# Patient Record
Sex: MALE | Race: WHITE | NOT HISPANIC OR LATINO | Employment: FULL TIME | URBAN - NONMETROPOLITAN AREA
[De-identification: names, ages, dates, MRNs, and addresses within clinical notes are randomized per-mention and may not be internally consistent; named-entity substitution may affect disease eponyms.]

---

## 2021-01-07 ENCOUNTER — IMMUNIZATION (OUTPATIENT)
Dept: OBSTETRICS AND GYNECOLOGY | Facility: CLINIC | Age: 86
End: 2021-01-07
Payer: MEDICARE

## 2021-01-07 DIAGNOSIS — Z23 NEED FOR VACCINATION: ICD-10-CM

## 2021-01-07 PROCEDURE — 91300 COVID-19, MRNA, LNP-S, PF, 30 MCG/0.3 ML DOSE VACCINE: CPT | Mod: PBBFAC

## 2021-01-28 ENCOUNTER — IMMUNIZATION (OUTPATIENT)
Dept: OBSTETRICS AND GYNECOLOGY | Facility: CLINIC | Age: 86
End: 2021-01-28
Payer: MEDICARE

## 2021-01-28 DIAGNOSIS — Z23 NEED FOR VACCINATION: Primary | ICD-10-CM

## 2021-01-28 PROCEDURE — 91300 COVID-19, MRNA, LNP-S, PF, 30 MCG/0.3 ML DOSE VACCINE: CPT | Mod: PBBFAC

## 2021-01-28 PROCEDURE — 0002A COVID-19, MRNA, LNP-S, PF, 30 MCG/0.3 ML DOSE VACCINE: CPT | Mod: PBBFAC

## 2021-04-01 ENCOUNTER — HOSPITAL ENCOUNTER (OUTPATIENT)
Dept: RADIOLOGY | Facility: HOSPITAL | Age: 86
Discharge: HOME | End: 2021-04-01
Attending: INTERNAL MEDICINE
Payer: MEDICARE

## 2021-04-01 DIAGNOSIS — R05.9 COUGH: ICD-10-CM

## 2021-04-01 DIAGNOSIS — R05.9 COUGH: Primary | ICD-10-CM

## 2021-04-01 PROCEDURE — 71046 X-RAY EXAM CHEST 2 VIEWS: CPT | Mod: TC

## 2021-10-15 ENCOUNTER — IMMUNIZATION (OUTPATIENT)
Dept: OBSTETRICS AND GYNECOLOGY | Facility: CLINIC | Age: 86
End: 2021-10-15
Payer: MEDICARE

## 2021-10-15 DIAGNOSIS — Z23 NEED FOR VACCINATION: Primary | ICD-10-CM

## 2021-10-15 PROCEDURE — 0003A COVID-19, MRNA, LNP-S, PF, 30 MCG/0.3 ML DOSE VACCINE: CPT | Mod: PBBFAC | Performed by: ANESTHESIOLOGY

## 2021-10-15 PROCEDURE — 91300 COVID-19, MRNA, LNP-S, PF, 30 MCG/0.3 ML DOSE VACCINE: CPT | Mod: PBBFAC

## 2021-11-17 ENCOUNTER — HOSPITAL ENCOUNTER (OUTPATIENT)
Dept: RADIOLOGY | Facility: HOSPITAL | Age: 86
Discharge: HOME | End: 2021-11-17
Attending: NURSE PRACTITIONER
Payer: MEDICARE

## 2021-11-17 DIAGNOSIS — R91.8 ABNORMAL FINDINGS ON DIAGNOSTIC IMAGING OF LUNG: Primary | ICD-10-CM

## 2021-11-17 DIAGNOSIS — R91.8 ABNORMAL FINDINGS ON DIAGNOSTIC IMAGING OF LUNG: ICD-10-CM

## 2021-11-17 PROCEDURE — 71250 CT THORAX DX C-: CPT | Mod: TC

## 2021-11-30 ENCOUNTER — HOSPITAL ENCOUNTER (OUTPATIENT)
Dept: RADIOLOGY | Facility: HOSPITAL | Age: 86
Discharge: HOME | End: 2021-11-30
Attending: INTERNAL MEDICINE
Payer: MEDICARE

## 2021-11-30 DIAGNOSIS — I26.99 PULMONARY EMBOLISM: Primary | ICD-10-CM

## 2021-11-30 DIAGNOSIS — I26.99 PULMONARY EMBOLISM: ICD-10-CM

## 2021-11-30 PROCEDURE — 25500020 PHARM REV CODE 255: Performed by: INTERNAL MEDICINE

## 2021-11-30 PROCEDURE — 71275 CT ANGIOGRAPHY CHEST: CPT | Mod: TC

## 2021-11-30 RX ADMIN — IOHEXOL 100 ML: 350 INJECTION, SOLUTION INTRAVENOUS at 10:11

## 2021-12-07 ENCOUNTER — LAB VISIT (OUTPATIENT)
Dept: LAB | Facility: HOSPITAL | Age: 86
End: 2021-12-07
Attending: INTERNAL MEDICINE
Payer: MEDICARE

## 2021-12-07 DIAGNOSIS — K92.1 BLOOD IN STOOL: ICD-10-CM

## 2021-12-07 DIAGNOSIS — D64.9 ANEMIA, UNSPECIFIED: Primary | ICD-10-CM

## 2021-12-07 LAB — OB PNL STL: NEGATIVE

## 2021-12-07 PROCEDURE — 82272 OCCULT BLD FECES 1-3 TESTS: CPT | Performed by: INTERNAL MEDICINE

## 2021-12-15 DIAGNOSIS — R91.1 PULMONARY NODULE: Primary | ICD-10-CM

## 2021-12-29 ENCOUNTER — HOSPITAL ENCOUNTER (INPATIENT)
Facility: HOSPITAL | Age: 86
LOS: 3 days | Discharge: HOME | DRG: 378 | End: 2022-01-01
Attending: INTERNAL MEDICINE | Admitting: INTERNAL MEDICINE
Payer: MEDICARE

## 2021-12-29 DIAGNOSIS — D62 ACUTE BLOOD LOSS ANEMIA: ICD-10-CM

## 2021-12-29 DIAGNOSIS — K92.2 GASTROINTESTINAL HEMORRHAGE, UNSPECIFIED GASTROINTESTINAL HEMORRHAGE TYPE: ICD-10-CM

## 2021-12-29 DIAGNOSIS — I47.19 AVNRT (AV NODAL RE-ENTRY TACHYCARDIA): ICD-10-CM

## 2021-12-29 DIAGNOSIS — K25.0 ACUTE GASTRIC ULCER WITH HEMORRHAGE: Primary | ICD-10-CM

## 2021-12-29 DIAGNOSIS — N17.9 AKI (ACUTE KIDNEY INJURY): ICD-10-CM

## 2021-12-29 DIAGNOSIS — R07.9 CHEST PAIN: ICD-10-CM

## 2021-12-29 DIAGNOSIS — R00.1 BRADYCARDIA: ICD-10-CM

## 2021-12-29 DIAGNOSIS — K92.2 UGIB (UPPER GASTROINTESTINAL BLEED): ICD-10-CM

## 2021-12-29 LAB
ABO + RH BLD: NORMAL
ALBUMIN SERPL BCP-MCNC: 3.1 G/DL (ref 3.5–5.2)
ALP SERPL-CCNC: 90 U/L (ref 55–135)
ALT SERPL W/O P-5'-P-CCNC: 26 U/L (ref 10–44)
ANION GAP SERPL CALC-SCNC: 11 MMOL/L (ref 8–16)
APTT BLDCRRT: 21.7 SEC (ref 21–32)
AST SERPL-CCNC: 11 U/L (ref 10–40)
BILIRUB SERPL-MCNC: 0.3 MG/DL (ref 0.1–1)
BLD GP AB SCN CELLS X3 SERPL QL: NORMAL
BUN SERPL-MCNC: 86 MG/DL (ref 8–23)
CALCIUM SERPL-MCNC: 9.3 MG/DL (ref 8.7–10.5)
CHLORIDE SERPL-SCNC: 105 MMOL/L (ref 95–110)
CO2 SERPL-SCNC: 23 MMOL/L (ref 23–29)
CREAT SERPL-MCNC: 1.8 MG/DL (ref 0.5–1.4)
ERYTHROCYTE [DISTWIDTH] IN BLOOD BY AUTOMATED COUNT: 14.3 % (ref 11.5–14.5)
EST. GFR  (AFRICAN AMERICAN): 38.8 ML/MIN/1.73 M^2
EST. GFR  (NON AFRICAN AMERICAN): 33.6 ML/MIN/1.73 M^2
GLUCOSE SERPL-MCNC: 169 MG/DL (ref 70–110)
HCT VFR BLD AUTO: 29.2 % (ref 40–54)
HGB BLD-MCNC: 9.5 G/DL (ref 14–18)
INR PPP: 1 (ref 0.8–1.2)
MCH RBC QN AUTO: 30.2 PG (ref 27–31)
MCHC RBC AUTO-ENTMCNC: 32.5 G/DL (ref 32–36)
MCV RBC AUTO: 93 FL (ref 82–98)
PLATELET # BLD AUTO: 221 K/UL (ref 150–450)
PMV BLD AUTO: 11.1 FL (ref 9.2–12.9)
POTASSIUM SERPL-SCNC: 4.1 MMOL/L (ref 3.5–5.1)
PROT SERPL-MCNC: 6.5 G/DL (ref 6–8.4)
PROTHROMBIN TIME: 10.6 SEC (ref 9–12.5)
RBC # BLD AUTO: 3.15 M/UL (ref 4.6–6.2)
SODIUM SERPL-SCNC: 139 MMOL/L (ref 136–145)
TSH SERPL DL<=0.005 MIU/L-ACNC: 3.01 UIU/ML (ref 0.4–4)
WBC # BLD AUTO: 17.26 K/UL (ref 3.9–12.7)

## 2021-12-29 PROCEDURE — 85730 THROMBOPLASTIN TIME PARTIAL: CPT | Performed by: INTERNAL MEDICINE

## 2021-12-29 PROCEDURE — 25000003 PHARM REV CODE 250: Performed by: INTERNAL MEDICINE

## 2021-12-29 PROCEDURE — 85027 COMPLETE CBC AUTOMATED: CPT | Performed by: INTERNAL MEDICINE

## 2021-12-29 PROCEDURE — 86920 COMPATIBILITY TEST SPIN: CPT | Performed by: INTERNAL MEDICINE

## 2021-12-29 PROCEDURE — 86850 RBC ANTIBODY SCREEN: CPT | Performed by: INTERNAL MEDICINE

## 2021-12-29 PROCEDURE — 80053 COMPREHEN METABOLIC PANEL: CPT | Performed by: INTERNAL MEDICINE

## 2021-12-29 PROCEDURE — 84443 ASSAY THYROID STIM HORMONE: CPT | Performed by: INTERNAL MEDICINE

## 2021-12-29 PROCEDURE — C9113 INJ PANTOPRAZOLE SODIUM, VIA: HCPCS | Performed by: INTERNAL MEDICINE

## 2021-12-29 PROCEDURE — 85610 PROTHROMBIN TIME: CPT | Performed by: INTERNAL MEDICINE

## 2021-12-29 PROCEDURE — 63600175 PHARM REV CODE 636 W HCPCS: Performed by: INTERNAL MEDICINE

## 2021-12-29 PROCEDURE — 36415 COLL VENOUS BLD VENIPUNCTURE: CPT | Performed by: INTERNAL MEDICINE

## 2021-12-29 PROCEDURE — 11000001 HC ACUTE MED/SURG PRIVATE ROOM

## 2021-12-29 RX ORDER — SODIUM CHLORIDE 9 MG/ML
INJECTION, SOLUTION INTRAVENOUS CONTINUOUS
Status: DISCONTINUED | OUTPATIENT
Start: 2021-12-29 | End: 2022-01-01

## 2021-12-29 RX ORDER — ONDANSETRON 2 MG/ML
4 INJECTION INTRAMUSCULAR; INTRAVENOUS EVERY 8 HOURS PRN
Status: DISCONTINUED | OUTPATIENT
Start: 2021-12-29 | End: 2022-01-01 | Stop reason: HOSPADM

## 2021-12-29 RX ORDER — SODIUM CHLORIDE 0.9 % (FLUSH) 0.9 %
10 SYRINGE (ML) INJECTION EVERY 12 HOURS PRN
Status: DISCONTINUED | OUTPATIENT
Start: 2021-12-29 | End: 2022-01-01 | Stop reason: HOSPADM

## 2021-12-29 RX ORDER — PANTOPRAZOLE SODIUM 40 MG/10ML
40 INJECTION, POWDER, LYOPHILIZED, FOR SOLUTION INTRAVENOUS 2 TIMES DAILY
Status: DISCONTINUED | OUTPATIENT
Start: 2021-12-29 | End: 2022-01-01 | Stop reason: HOSPADM

## 2021-12-29 RX ORDER — ACETAMINOPHEN 325 MG/1
650 TABLET ORAL EVERY 6 HOURS PRN
Status: DISCONTINUED | OUTPATIENT
Start: 2021-12-29 | End: 2022-01-01 | Stop reason: HOSPADM

## 2021-12-29 RX ADMIN — PANTOPRAZOLE SODIUM 40 MG: 40 INJECTION, POWDER, FOR SOLUTION INTRAVENOUS at 06:12

## 2021-12-29 RX ADMIN — SODIUM CHLORIDE: 0.9 INJECTION, SOLUTION INTRAVENOUS at 09:12

## 2021-12-30 ENCOUNTER — ANESTHESIA (OUTPATIENT)
Dept: ENDOSCOPY | Facility: HOSPITAL | Age: 86
DRG: 378 | End: 2021-12-30
Payer: MEDICARE

## 2021-12-30 ENCOUNTER — ANESTHESIA EVENT (OUTPATIENT)
Dept: ENDOSCOPY | Facility: HOSPITAL | Age: 86
DRG: 378 | End: 2021-12-30
Payer: MEDICARE

## 2021-12-30 PROBLEM — D62 ACUTE BLOOD LOSS ANEMIA: Status: ACTIVE | Noted: 2021-12-30

## 2021-12-30 PROBLEM — K25.0 ACUTE GASTRIC ULCER WITH HEMORRHAGE: Status: ACTIVE | Noted: 2021-12-30

## 2021-12-30 PROBLEM — R00.1 BRADYCARDIA: Status: ACTIVE | Noted: 2021-12-30

## 2021-12-30 PROBLEM — N17.9 AKI (ACUTE KIDNEY INJURY): Status: ACTIVE | Noted: 2021-12-30

## 2021-12-30 PROBLEM — K92.2 GIB (GASTROINTESTINAL BLEEDING): Status: ACTIVE | Noted: 2021-12-30

## 2021-12-30 PROBLEM — I47.19 AVNRT (AV NODAL RE-ENTRY TACHYCARDIA): Status: ACTIVE | Noted: 2021-12-30

## 2021-12-30 LAB
ALBUMIN SERPL BCP-MCNC: 2.5 G/DL (ref 3.5–5.2)
ALP SERPL-CCNC: 72 U/L (ref 55–135)
ALT SERPL W/O P-5'-P-CCNC: 19 U/L (ref 10–44)
ANION GAP SERPL CALC-SCNC: 8 MMOL/L (ref 8–16)
AST SERPL-CCNC: 8 U/L (ref 10–40)
BASOPHILS # BLD AUTO: 0.04 K/UL (ref 0–0.2)
BASOPHILS NFR BLD: 0.4 % (ref 0–1.9)
BILIRUB SERPL-MCNC: 0.3 MG/DL (ref 0.1–1)
BUN SERPL-MCNC: 80 MG/DL (ref 8–23)
CALCIUM SERPL-MCNC: 8.4 MG/DL (ref 8.7–10.5)
CHLORIDE SERPL-SCNC: 108 MMOL/L (ref 95–110)
CO2 SERPL-SCNC: 25 MMOL/L (ref 23–29)
CREAT SERPL-MCNC: 1.6 MG/DL (ref 0.5–1.4)
DIFFERENTIAL METHOD: ABNORMAL
EOSINOPHIL # BLD AUTO: 0.1 K/UL (ref 0–0.5)
EOSINOPHIL NFR BLD: 0.6 % (ref 0–8)
ERYTHROCYTE [DISTWIDTH] IN BLOOD BY AUTOMATED COUNT: 14.2 % (ref 11.5–14.5)
EST. GFR  (AFRICAN AMERICAN): 44.7 ML/MIN/1.73 M^2
EST. GFR  (NON AFRICAN AMERICAN): 38.7 ML/MIN/1.73 M^2
GLUCOSE SERPL-MCNC: 136 MG/DL (ref 70–110)
HCT VFR BLD AUTO: 25.8 % (ref 40–54)
HGB BLD-MCNC: 8.4 G/DL (ref 14–18)
IMM GRANULOCYTES # BLD AUTO: 0.06 K/UL (ref 0–0.04)
IMM GRANULOCYTES NFR BLD AUTO: 0.6 % (ref 0–0.5)
LYMPHOCYTES # BLD AUTO: 1.8 K/UL (ref 1–4.8)
LYMPHOCYTES NFR BLD: 17.9 % (ref 18–48)
MAGNESIUM SERPL-MCNC: 2.5 MG/DL (ref 1.6–2.6)
MCH RBC QN AUTO: 29.9 PG (ref 27–31)
MCHC RBC AUTO-ENTMCNC: 32.6 G/DL (ref 32–36)
MCV RBC AUTO: 92 FL (ref 82–98)
MONOCYTES # BLD AUTO: 0.7 K/UL (ref 0.3–1)
MONOCYTES NFR BLD: 6.9 % (ref 4–15)
NEUTROPHILS # BLD AUTO: 7.5 K/UL (ref 1.8–7.7)
NEUTROPHILS NFR BLD: 73.6 % (ref 38–73)
NRBC BLD-RTO: 0 /100 WBC
PLATELET # BLD AUTO: 189 K/UL (ref 150–450)
PMV BLD AUTO: 11.3 FL (ref 9.2–12.9)
POTASSIUM SERPL-SCNC: 4 MMOL/L (ref 3.5–5.1)
PROT SERPL-MCNC: 5.3 G/DL (ref 6–8.4)
RBC # BLD AUTO: 2.81 M/UL (ref 4.6–6.2)
SODIUM SERPL-SCNC: 141 MMOL/L (ref 136–145)
WBC # BLD AUTO: 10.23 K/UL (ref 3.9–12.7)

## 2021-12-30 PROCEDURE — 85025 COMPLETE CBC W/AUTO DIFF WBC: CPT | Performed by: INTERNAL MEDICINE

## 2021-12-30 PROCEDURE — 37000009 HC ANESTHESIA EA ADD 15 MINS: Performed by: SURGERY

## 2021-12-30 PROCEDURE — C9113 INJ PANTOPRAZOLE SODIUM, VIA: HCPCS | Performed by: INTERNAL MEDICINE

## 2021-12-30 PROCEDURE — 80053 COMPREHEN METABOLIC PANEL: CPT | Performed by: INTERNAL MEDICINE

## 2021-12-30 PROCEDURE — 25000003 PHARM REV CODE 250: Performed by: INTERNAL MEDICINE

## 2021-12-30 PROCEDURE — 11000001 HC ACUTE MED/SURG PRIVATE ROOM

## 2021-12-30 PROCEDURE — 43239 EGD BIOPSY SINGLE/MULTIPLE: CPT | Performed by: SURGERY

## 2021-12-30 PROCEDURE — 83735 ASSAY OF MAGNESIUM: CPT | Performed by: INTERNAL MEDICINE

## 2021-12-30 PROCEDURE — 88305 TISSUE EXAM BY PATHOLOGIST: CPT | Mod: TC | Performed by: ANESTHESIOLOGY

## 2021-12-30 PROCEDURE — 63600175 PHARM REV CODE 636 W HCPCS: Performed by: SURGERY

## 2021-12-30 PROCEDURE — 37000008 HC ANESTHESIA 1ST 15 MINUTES: Performed by: SURGERY

## 2021-12-30 PROCEDURE — 25000003 PHARM REV CODE 250: Performed by: SURGERY

## 2021-12-30 PROCEDURE — 27201423 OPTIME MED/SURG SUP & DEVICES STERILE SUPPLY: Performed by: SURGERY

## 2021-12-30 PROCEDURE — 87081 CULTURE SCREEN ONLY: CPT | Performed by: SURGERY

## 2021-12-30 PROCEDURE — 36415 COLL VENOUS BLD VENIPUNCTURE: CPT | Performed by: INTERNAL MEDICINE

## 2021-12-30 PROCEDURE — 63600175 PHARM REV CODE 636 W HCPCS: Performed by: INTERNAL MEDICINE

## 2021-12-30 PROCEDURE — C9113 INJ PANTOPRAZOLE SODIUM, VIA: HCPCS | Performed by: SURGERY

## 2021-12-30 RX ORDER — MAG HYDROX/ALUMINUM HYD/SIMETH 200-200-20
30 SUSPENSION, ORAL (FINAL DOSE FORM) ORAL
Status: DISCONTINUED | OUTPATIENT
Start: 2021-12-30 | End: 2022-01-01 | Stop reason: HOSPADM

## 2021-12-30 RX ORDER — PROPOFOL 10 MG/ML
VIAL (ML) INTRAVENOUS
Status: DISCONTINUED | OUTPATIENT
Start: 2021-12-30 | End: 2021-12-30

## 2021-12-30 RX ORDER — SUCRALFATE 1 G/10ML
0.5 SUSPENSION ORAL
Status: DISCONTINUED | OUTPATIENT
Start: 2021-12-30 | End: 2022-01-01 | Stop reason: HOSPADM

## 2021-12-30 RX ADMIN — ALUMINUM HYDROXIDE, MAGNESIUM HYDROXIDE, AND SIMETHICONE 30 ML: 200; 200; 20 SUSPENSION ORAL at 02:12

## 2021-12-30 RX ADMIN — PANTOPRAZOLE SODIUM 40 MG: 40 INJECTION, POWDER, FOR SOLUTION INTRAVENOUS at 08:12

## 2021-12-30 RX ADMIN — ALUMINUM HYDROXIDE, MAGNESIUM HYDROXIDE, AND SIMETHICONE 30 ML: 200; 200; 20 SUSPENSION ORAL at 08:12

## 2021-12-30 RX ADMIN — Medication 50 MG: at 10:12

## 2021-12-30 RX ADMIN — SODIUM CHLORIDE: 0.9 INJECTION, SOLUTION INTRAVENOUS at 05:12

## 2021-12-30 RX ADMIN — TOPICAL ANESTHETIC 2 EACH: 200 SPRAY DENTAL; PERIODONTAL at 10:12

## 2021-12-30 RX ADMIN — SUCRALFATE 0.5 G: 1 SUSPENSION ORAL at 08:12

## 2021-12-30 RX ADMIN — SUCRALFATE 0.5 G: 1 SUSPENSION ORAL at 04:12

## 2021-12-30 RX ADMIN — ALUMINUM HYDROXIDE, MAGNESIUM HYDROXIDE, AND SIMETHICONE 30 ML: 200; 200; 20 SUSPENSION ORAL at 06:12

## 2021-12-30 RX ADMIN — SODIUM CHLORIDE 75 ML/HR: 0.9 INJECTION, SOLUTION INTRAVENOUS at 11:12

## 2021-12-30 RX ADMIN — SODIUM CHLORIDE: 0.9 INJECTION, SOLUTION INTRAVENOUS at 10:12

## 2021-12-30 NOTE — HPI
Patient is a 86-year-old male well known to me with a history of hypertension super ventricular tachycardia obesity pneumonia and pleural effusion who recently was found to have anemia and outpatient testing.  The patient was undergoing diagnostic testing and ultimately began noticing dark stools as well as a bloody bowel movement and then yesterday had an episode of hematemesis.  The patient was seen in the emergency department and we are currently on divert with very little bed availability and discharged home in stable condition.  He saw me for follow-up and the family was having difficulty caring for the patient he was in a wheelchair unable to transfer ultimately we readmitted the patient to the hospital for close monitoring and diagnostic testing.  Patient recently had imaging studies for an abnormal chest x-ray, see results for details.

## 2021-12-30 NOTE — UM SECONDARY REVIEW
Physician Advisor External    PA - Medical Necessity Issue    Approved Observation     Sent to [facility] for secondary review. Recommended level of care is outpatient. Notified Dr. Stefan Fleming III who would like to leave the patient in inpatient status.

## 2021-12-30 NOTE — PLAN OF CARE
[facility] Surg  Initial Discharge Assessment       Primary Care Provider: Stefan Fleming III, MD    Admission Diagnosis: UGIB (upper gastrointestinal bleed) [K92.2]    Admission Date: 12/29/2021  Expected Discharge Date:     Discharge Barriers Identified: None    Payor: MEDICARE / Plan: MEDICARE PART A & B / Product Type: Government /     Extended Emergency Contact Information  Primary Emergency Contact: MARYAN MCCALLUM  Mobile Phone: [phone]  Relation: Spouse  Preferred language: English    Discharge Plan A: Home with family  Discharge Plan B: Home    No Pharmacies Listed    Initial Assessment (most recent)     Adult Discharge Assessment - 12/30/21 1052        Discharge Assessment    Assessment Type Discharge Planning Assessment     Confirmed/corrected address, phone number and insurance Yes     Confirmed Demographics Correct on Facesheet     Source of Information patient;family     Lives With spouse     Do you expect to return to your current living situation? Yes     Do you have help at home or someone to help you manage your care at home? Yes     Who are your caregiver(s) and their phone number(s)? Maryan Mccallum - [phone]     Prior to hospitilization cognitive status: Alert/Oriented     Current cognitive status: Alert/Oriented     Walking or Climbing Stairs Difficulty none     Dressing/Bathing Difficulty none     Home Accessibility stairs to enter home     Number of Stairs, Main Entrance one   Patient reports no trouble with ambulating step.    Home Layout Able to live on 1st floor     Equipment Currently Used at Home none     Readmission within 30 days? No     Patient currently being followed by outpatient case management? No     Do you currently have service(s) that help you manage your care at home? No     Do you take prescription medications? Yes     Do you have prescription coverage? Yes     Do you have any problems affording any of your prescribed medications? No     Are you on dialysis? No      Do you take coumadin? No     Discharge Plan A Home with family     Discharge Plan B Home     DME Needed Upon Discharge  none     Discharge Plan discussed with: Spouse/sig other;Patient     Discharge Barriers Identified None                  met with the patient and his wife to complete the discharge planning assessment. No current needs identified.     Case management to remain available for any future needs.

## 2021-12-30 NOTE — PLAN OF CARE
Plan of care reviewed with wife and patient. Educated on calling for needs, monitoring of lab values, fall prevention, npo status, administered medications and IV fluids. Verbalized understanding, will cont to monitor

## 2021-12-30 NOTE — H&P
[facility] Medicine  History & Physical    Patient Name: Geraldo Mccallum  MRN: [id number]  Patient Class: IP- Inpatient  Admission Date: 12/29/2021  Attending Physician: Stefan Fleming III, MD  Primary Care Provider: Stefan Fleming III, MD         Patient information was obtained from patient and ER records.     Subjective:     Principal Problem:GIB (gastrointestinal bleeding)    Chief Complaint:   Chief Complaint   Patient presents with    Weakness        HPI: Patient is a 86-year-old male well known to me with a history of hypertension super ventricular tachycardia obesity pneumonia and pleural effusion who recently was found to have anemia and outpatient testing.  The patient was undergoing diagnostic testing and ultimately began noticing dark stools as well as a bloody bowel movement and then yesterday had an episode of hematemesis.  The patient was seen in the emergency department and we are currently on divert with very little bed availability and discharged home in stable condition.  He saw me for follow-up and the family was having difficulty caring for the patient he was in a wheelchair unable to transfer ultimately we readmitted the patient to the hospital for close monitoring and diagnostic testing.  Patient recently had imaging studies for an abnormal chest x-ray, see results for details.      No past medical history on file.    Past Surgical History:   Procedure Laterality Date    TOTAL KNEE ARTHROPLASTY Bilateral        Review of patient's allergies indicates:  No Known Allergies    No current facility-administered medications on file prior to encounter.     No current outpatient medications on file prior to encounter.     Family History    None       Tobacco Use    Smoking status: Not on file    Smokeless tobacco: Not on file   Substance and Sexual Activity    Alcohol use: Not on file    Drug use: Not on file    Sexual activity: Not on file     Review of Systems    Constitutional: Positive for activity change, appetite change and fatigue. Negative for chills and fever.   HENT: Negative for ear pain, mouth sores, nosebleeds and sore throat.    Eyes: Negative for visual disturbance.   Respiratory: Positive for shortness of breath. Negative for cough and wheezing.    Cardiovascular: Negative for chest pain, palpitations and leg swelling.   Gastrointestinal: Positive for blood in stool. Negative for abdominal distention, abdominal pain, constipation, diarrhea, nausea and vomiting.        Melena   Endocrine: Negative for polyphagia.   Genitourinary: Negative for difficulty urinating, dysuria, flank pain and frequency.   Musculoskeletal: Positive for arthralgias. Negative for back pain and myalgias.   Skin: Negative for rash.   Neurological: Positive for weakness and light-headedness. Negative for dizziness, tremors, seizures, syncope, facial asymmetry, speech difficulty and headaches.   Hematological: Negative for adenopathy.   Psychiatric/Behavioral: Negative for agitation, confusion and hallucinations. The patient is not nervous/anxious.      Objective:     Vital Signs (Most Recent):  Temp: 98 °F (36.7 °C) (12/30/21 0730)  Pulse: (!) 55 (12/30/21 0730)  Resp: 18 (12/30/21 0730)  BP: (!) 141/58 (12/30/21 0730)  SpO2: (!) 93 % (12/30/21 0730) Vital Signs (24h Range):  Temp:  [97.3 °F (36.3 °C)-98.1 °F (36.7 °C)] 98 °F (36.7 °C)  Pulse:  [] 55  Resp:  [18-24] 18  SpO2:  [93 %-98 %] 93 %  BP: (111-141)/(58-73) 141/58     Weight: 110.2 kg (243 lb)  Body mass index is 34.87 kg/m².    Physical Exam  Constitutional:       General: He is awake. He is not in acute distress.     Appearance: Normal appearance. He is ill-appearing. He is not toxic-appearing or diaphoretic.   HENT:      Head: Normocephalic and atraumatic.      Nose: Nose normal. No congestion or rhinorrhea.      Mouth/Throat:      Mouth: Mucous membranes are moist.      Pharynx: Oropharynx is clear. No oropharyngeal  exudate or posterior oropharyngeal erythema.   Eyes:      General: No scleral icterus.        Right eye: No discharge.         Left eye: No discharge.      Extraocular Movements: Extraocular movements intact.      Pupils: Pupils are equal, round, and reactive to light.   Neck:      Thyroid: No thyroid mass or thyromegaly.      Vascular: No carotid bruit.      Meningeal: Brudzinski's sign and Kernig's sign absent.   Cardiovascular:      Rate and Rhythm: Normal rate and regular rhythm.      Chest Wall: PMI is not displaced. No thrill.      Pulses: Normal pulses.      Heart sounds: Normal heart sounds. No murmur heard.  No friction rub. No gallop.    Pulmonary:      Effort: Pulmonary effort is normal. No tachypnea, accessory muscle usage, prolonged expiration or respiratory distress.      Breath sounds: Normal breath sounds. No stridor or decreased air movement. No wheezing, rhonchi or rales.   Chest:      Chest wall: No tenderness.   Abdominal:      General: Bowel sounds are normal. There is no distension.      Palpations: Abdomen is soft. There is no hepatomegaly, splenomegaly or mass.      Tenderness: There is no abdominal tenderness. There is no right CVA tenderness, left CVA tenderness, guarding or rebound.      Hernia: No hernia is present.   Musculoskeletal:         General: No swelling, tenderness, deformity or signs of injury. Normal range of motion.      Cervical back: Normal range of motion and neck supple. No rigidity. No muscular tenderness.      Right lower leg: No edema.      Left lower leg: No edema.   Lymphadenopathy:      Cervical: No cervical adenopathy.   Skin:     General: Skin is warm.      Capillary Refill: Capillary refill takes less than 2 seconds.      Coloration: Skin is not cyanotic, jaundiced or pale.      Findings: No bruising, erythema, lesion, petechiae or rash.   Neurological:      Mental Status: He is alert and oriented to person, place, and time.      Cranial Nerves: No cranial nerve  deficit, dysarthria or facial asymmetry.      Motor: No weakness or tremor.   Psychiatric:         Mood and Affect: Mood normal. Mood is not anxious or depressed. Affect is not flat.         Speech: Speech is not rapid and pressured or slurred.         Behavior: Behavior normal. Behavior is not agitated, aggressive or combative.         Thought Content: Thought content normal. Thought content is not paranoid or delusional.         Cognition and Memory: Cognition is not impaired. Memory is not impaired.         Judgment: Judgment normal.           CRANIAL NERVES     CN III, IV, VI   Pupils are equal, round, and reactive to light.       Significant Labs: All pertinent labs within the past 24 hours have been reviewed.    Significant Imaging: I have reviewed all pertinent imaging results/findings within the past 24 hours.    Assessment/Plan:     * GIB (gastrointestinal bleeding)  EGD asap.      Bradycardia  Stable, chronic      AVNRT (AV megan re-entry tachycardia)  stable      ETHAN (acute kidney injury)  Noted.      Acute blood loss anemia  montior        VTE Risk Mitigation (From admission, onward)         Ordered     IP VTE LOW RISK PATIENT  Once         12/29/21 1820     Place sequential compression device  Until discontinued         12/29/21 1820                   Stefan Fleming III, MD  Department of Hospital Medicine   [facility]

## 2021-12-30 NOTE — CONSULTS
[facility] Surg  General Surgery  Consult Note    Inpatient consult to General Surgery  Consult performed by: Duane Cardenas MD  Consult ordered by: Stefan Fleming III, MD  Reason for consult: Upper GI bleeding and anemia  Assessment/Recommendations: 1. At this point, the patient appears to be very stable therefore I have recommended that we proceed with an EGD to assess this area of bleeding and make further recommendations.  I have counseled the patient and his wife regarding the procedure as well as possible hazards and complications including perforation and they understand and agree.  2. His H&H has dropped down to 8.4.  We will see what the patient does the during the next 24 hours before recommending transfusion.          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  He finally came to the emergency room 2 days ago  because he coughed up and feels achy probably vomited a small amount of blood.  This was followed with a clot in his vomitus and even had episode of vomiting in the emergency room with a blood.  After evaluation it was felt that outpatient care could be done.  He went to his family doctor yesterday where in view of his present status he did to be admitted for further evaluation and treatment.  Since he has been in the hospital he has a vomited any blood.  He does describe melenic stools.  He has never had anything like this before.  He is on no anti coagulants and is only taking a baby aspirin as anti-inflammatory.    No current facility-administered medications on file prior to encounter.     No current outpatient medications on file prior to encounter.       Review of patient's allergies indicates:  No Known Allergies    History reviewed. No pertinent past medical  history.  Past Surgical History:   Procedure Laterality Date    TOTAL KNEE ARTHROPLASTY Bilateral      Family History    None       Tobacco Use    Smoking status: Not on file    Smokeless tobacco: Not on file   Substance and Sexual Activity    Alcohol use: Not on file    Drug use: Not on file    Sexual activity: Not on file     Review of Systems   Gastrointestinal: Negative for abdominal distention and abdominal pain.        Hematemesis and melena   All other systems reviewed and are negative.    Objective:     Vital Signs (Most Recent):  Temp: 98 °F (36.7 °C) (12/30/21 0730)  Pulse: (!) 55 (12/30/21 0730)  Resp: 18 (12/30/21 0730)  BP: (!) 141/58 (12/30/21 0730)  SpO2: (!) 93 % (12/30/21 0730) Vital Signs (24h Range):  Temp:  [97.3 °F (36.3 °C)-98.1 °F (36.7 °C)] 98 °F (36.7 °C)  Pulse:  [] 55  Resp:  [18-24] 18  SpO2:  [93 %-98 %] 93 %  BP: (111-141)/(58-73) 141/58     Weight: 110.2 kg (243 lb)  Body mass index is 34.87 kg/m².      Intake/Output Summary (Last 24 hours) at 12/30/2021 0959  Last data filed at 12/30/2021 0505  Gross per 24 hour   Intake 970 ml   Output 650 ml   Net 320 ml       Physical Exam  Vitals reviewed.   Constitutional:       Appearance: He is obese.   HENT:      Head: Normocephalic and atraumatic.      Nose: Nose normal.      Mouth/Throat:      Mouth: Mucous membranes are moist.   Eyes:      Extraocular Movements: Extraocular movements intact.   Cardiovascular:      Rate and Rhythm: Normal rate and regular rhythm.   Pulmonary:      Effort: Pulmonary effort is normal.      Breath sounds: Normal breath sounds.   Abdominal:      General: Abdomen is flat. There is no distension.      Palpations: Abdomen is soft. There is no mass.      Tenderness: There is abdominal tenderness (Complains of mild discomfort to palpation laterally in the left upper quadrant but no rebound).   Musculoskeletal:         General: Normal range of motion.      Cervical back: Neck supple.   Lymphadenopathy:       Cervical: Cervical adenopathy present.   Skin:     General: Skin is warm and dry.      Coloration: Skin is not jaundiced.   Neurological:      General: No focal deficit present.      Mental Status: He is alert and oriented to person, place, and time.   Psychiatric:         Mood and Affect: Mood normal.         Behavior: Behavior normal.         Significant Labs:  CBC:   Recent Labs   Lab 12/30/21  0310   WBC 10.23   RBC 2.81*   HGB 8.4*   HCT 25.8*      MCV 92   MCH 29.9   MCHC 32.6     CMP:   Recent Labs   Lab 12/30/21  0310   *   CALCIUM 8.4*   ALBUMIN 2.5*   PROT 5.3*      K 4.0   CO2 25      BUN 80*   CREATININE 1.6*   ALKPHOS 72   ALT 19   AST 8*   BILITOT 0.3     Coagulation:   Recent Labs   Lab 12/29/21  1842   INR 1.0   APTT 21.7     All pertinent labs from the last 24 hours have been reviewed.    Significant Diagnostics:  I have reviewed all pertinent imaging results/findings within the past 24 hours.    Assessment/Plan:     Active Diagnoses:    Diagnosis Date Noted POA    PRINCIPAL PROBLEM:  GIB (gastrointestinal bleeding) [K92.2] 12/30/2021 Yes    Acute blood loss anemia [D62] 12/30/2021 Yes    ETHAN (acute kidney injury) [N17.9] 12/30/2021 Yes    AVNRT (AV megan re-entry tachycardia) [I47.1] 12/30/2021 Yes    Bradycardia [R00.1] 12/30/2021 Yes      Problems Resolved During this Admission:       Thank you for your consult. Will arrange for the EGD this morning    Duane Cardenas MD  General Surgery  [facility] Surg

## 2021-12-30 NOTE — OP NOTE
[facility] Surg  EGD Procedure  Operative Note    SUMMARY     Date of Procedure: 12/30/2021     Procedure: Procedure(s) (LRB):  EGD, WITH CLOSED BIOPSY (N/A)    Surgeon(s) and Role:     * Duane Cardenas MD - Primary    Assisting Surgeon: None    Patient location: GI    Pre-Operative Diagnosis: UGIB (upper gastrointestinal bleed) [K92.2]    Post-Operative Diagnosis: Post-Op Diagnosis Codes:     * UGIB (upper gastrointestinal bleed) [K92.2]     * Gastric ulcer with hemorrhage [K25.4]     Indications:  Upper GI bleeding and anemia     ASA Score: IV    Procedure:                  The patient was brought in to the endoscopy suite where the risks, benefits, and alternatives of the procedure were described.  The patient was given the opportunity to ask questions and then signed informed consent. Patient was positioned in the left lateral decubitus position, continuous monitoring was initiated, and supplemental oxygen was provided via nasal cannula.  Bite block was placed.  Adequate sedation was achieved with the above mentioned medications and then titrated during the entire procedure.  Under direct visualization the gastroscope was introduced through the oropharynx in to the esophagus.  The scope was then advanced in to the stomach and second portion of the duodenum.  Scope was then withdrawn and the mucosa was carefully examined.  The entire gastric mucosa was examined, including the fundus with retroflexion.  Air was evacuated from the stomach and the scope was withdrawn in to the esophagus.  The entire esophageal mucosa was examined.  The patient tolerated the procedure well and was able to be transferred to the recovery area in stable condition.    Findings:                 Esophagus:  Upon insertion of the scope the vocal cords were seen and they appeared unremarkable.  We proceeded down the esophagus without problems.  The patient had no evidence of bleeding in the esophagus.                      Stomach:  Upon  entering the stomach there is no evidence of active bleeding.  We did notice a small hiatal hernia.  He had evidence of gastritis proximally in the stomach and in the distal antrum we identified also an ulcer.  Biopsies of the edge of the ulcer were done as well as biopsy for urease.  We went through the pylorus into the duodenum.  A retroflexed view of the cardia was done upon retrieving the scope that showed the mild gastritis once again but no ulcerations proximally.                    Duodenum:  The duodenum itself showed no evidence of inflammation or any blood.  We advanced all the way to the 3rd portion.     Specimens:   Specimen (24h ago, onward)            None            Estimated Blood Loss (EBL): * No values recorded between 12/30/2021 12:00 AM and 12/30/2021 10:28 AM *     Complications: No     Diagnostic Impression:  1. Acute gastric ulcer in the distal antrum.  2. Upper GI bleed secondary to 1. 3. Anemia secondary to 1.      Recommendations: Patient history to be returned back to his lund..    Disposition: Return back to med/surg unit     Attestation: I performed the procedure.        Follow Up:             Future Appointments   Date Time Provider Department Center   2/24/2022  9:00 AM [facility] CT1 [facility]SCAN [facility] Tomas Birriel, MD  12/30/2021

## 2021-12-30 NOTE — SUBJECTIVE & OBJECTIVE
No past medical history on file.    Past Surgical History:   Procedure Laterality Date    TOTAL KNEE ARTHROPLASTY Bilateral        Review of patient's allergies indicates:  No Known Allergies    No current facility-administered medications on file prior to encounter.     No current outpatient medications on file prior to encounter.     Family History    None       Tobacco Use    Smoking status: Not on file    Smokeless tobacco: Not on file   Substance and Sexual Activity    Alcohol use: Not on file    Drug use: Not on file    Sexual activity: Not on file     Review of Systems   Constitutional: Positive for activity change, appetite change and fatigue. Negative for chills and fever.   HENT: Negative for ear pain, mouth sores, nosebleeds and sore throat.    Eyes: Negative for visual disturbance.   Respiratory: Positive for shortness of breath. Negative for cough and wheezing.    Cardiovascular: Negative for chest pain, palpitations and leg swelling.   Gastrointestinal: Positive for blood in stool. Negative for abdominal distention, abdominal pain, constipation, diarrhea, nausea and vomiting.        Melena   Endocrine: Negative for polyphagia.   Genitourinary: Negative for difficulty urinating, dysuria, flank pain and frequency.   Musculoskeletal: Positive for arthralgias. Negative for back pain and myalgias.   Skin: Negative for rash.   Neurological: Positive for weakness and light-headedness. Negative for dizziness, tremors, seizures, syncope, facial asymmetry, speech difficulty and headaches.   Hematological: Negative for adenopathy.   Psychiatric/Behavioral: Negative for agitation, confusion and hallucinations. The patient is not nervous/anxious.      Objective:     Vital Signs (Most Recent):  Temp: 98 °F (36.7 °C) (12/30/21 0730)  Pulse: (!) 55 (12/30/21 0730)  Resp: 18 (12/30/21 0730)  BP: (!) 141/58 (12/30/21 0730)  SpO2: (!) 93 % (12/30/21 0730) Vital Signs (24h Range):  Temp:  [97.3 °F (36.3 °C)-98.1 °F  (36.7 °C)] 98 °F (36.7 °C)  Pulse:  [] 55  Resp:  [18-24] 18  SpO2:  [93 %-98 %] 93 %  BP: (111-141)/(58-73) 141/58     Weight: 110.2 kg (243 lb)  Body mass index is 34.87 kg/m².    Physical Exam  Constitutional:       General: He is awake. He is not in acute distress.     Appearance: Normal appearance. He is ill-appearing. He is not toxic-appearing or diaphoretic.   HENT:      Head: Normocephalic and atraumatic.      Nose: Nose normal. No congestion or rhinorrhea.      Mouth/Throat:      Mouth: Mucous membranes are moist.      Pharynx: Oropharynx is clear. No oropharyngeal exudate or posterior oropharyngeal erythema.   Eyes:      General: No scleral icterus.        Right eye: No discharge.         Left eye: No discharge.      Extraocular Movements: Extraocular movements intact.      Pupils: Pupils are equal, round, and reactive to light.   Neck:      Thyroid: No thyroid mass or thyromegaly.      Vascular: No carotid bruit.      Meningeal: Brudzinski's sign and Kernig's sign absent.   Cardiovascular:      Rate and Rhythm: Normal rate and regular rhythm.      Chest Wall: PMI is not displaced. No thrill.      Pulses: Normal pulses.      Heart sounds: Normal heart sounds. No murmur heard.  No friction rub. No gallop.    Pulmonary:      Effort: Pulmonary effort is normal. No tachypnea, accessory muscle usage, prolonged expiration or respiratory distress.      Breath sounds: Normal breath sounds. No stridor or decreased air movement. No wheezing, rhonchi or rales.   Chest:      Chest wall: No tenderness.   Abdominal:      General: Bowel sounds are normal. There is no distension.      Palpations: Abdomen is soft. There is no hepatomegaly, splenomegaly or mass.      Tenderness: There is no abdominal tenderness. There is no right CVA tenderness, left CVA tenderness, guarding or rebound.      Hernia: No hernia is present.   Musculoskeletal:         General: No swelling, tenderness, deformity or signs of injury. Normal  range of motion.      Cervical back: Normal range of motion and neck supple. No rigidity. No muscular tenderness.      Right lower leg: No edema.      Left lower leg: No edema.   Lymphadenopathy:      Cervical: No cervical adenopathy.   Skin:     General: Skin is warm.      Capillary Refill: Capillary refill takes less than 2 seconds.      Coloration: Skin is not cyanotic, jaundiced or pale.      Findings: No bruising, erythema, lesion, petechiae or rash.   Neurological:      Mental Status: He is alert and oriented to person, place, and time.      Cranial Nerves: No cranial nerve deficit, dysarthria or facial asymmetry.      Motor: No weakness or tremor.   Psychiatric:         Mood and Affect: Mood normal. Mood is not anxious or depressed. Affect is not flat.         Speech: Speech is not rapid and pressured or slurred.         Behavior: Behavior normal. Behavior is not agitated, aggressive or combative.         Thought Content: Thought content normal. Thought content is not paranoid or delusional.         Cognition and Memory: Cognition is not impaired. Memory is not impaired.         Judgment: Judgment normal.           CRANIAL NERVES     CN III, IV, VI   Pupils are equal, round, and reactive to light.       Significant Labs: All pertinent labs within the past 24 hours have been reviewed.    Significant Imaging: I have reviewed all pertinent imaging results/findings within the past 24 hours.

## 2021-12-30 NOTE — PLAN OF CARE
12/30/21 1317   Medicare Message   Important Message from Medicare regarding Discharge Appeal Rights Other (comments)  (Obtained by registration staff.)   Date IMM was signed 12/29/21   Time IMM was signed [id number]

## 2021-12-31 LAB
ALBUMIN SERPL BCP-MCNC: 2.5 G/DL (ref 3.5–5.2)
ALP SERPL-CCNC: 66 U/L (ref 55–135)
ALT SERPL W/O P-5'-P-CCNC: 15 U/L (ref 10–44)
ANION GAP SERPL CALC-SCNC: 6 MMOL/L (ref 8–16)
AST SERPL-CCNC: 10 U/L (ref 10–40)
BASOPHILS # BLD AUTO: 0.04 K/UL (ref 0–0.2)
BASOPHILS NFR BLD: 0.5 % (ref 0–1.9)
BILIRUB SERPL-MCNC: 0.3 MG/DL (ref 0.1–1)
BLD PROD TYP BPU: NORMAL
BLOOD UNIT EXPIRATION DATE: NORMAL
BLOOD UNIT TYPE CODE: 5100
BLOOD UNIT TYPE: NORMAL
BUN SERPL-MCNC: 51 MG/DL (ref 8–23)
CALCIUM SERPL-MCNC: 8.6 MG/DL (ref 8.7–10.5)
CHLORIDE SERPL-SCNC: 113 MMOL/L (ref 95–110)
CO2 SERPL-SCNC: 25 MMOL/L (ref 23–29)
CODING SYSTEM: NORMAL
CREAT SERPL-MCNC: 1.3 MG/DL (ref 0.5–1.4)
DIFFERENTIAL METHOD: ABNORMAL
DISPENSE STATUS: NORMAL
EOSINOPHIL # BLD AUTO: 0.2 K/UL (ref 0–0.5)
EOSINOPHIL NFR BLD: 1.7 % (ref 0–8)
ERYTHROCYTE [DISTWIDTH] IN BLOOD BY AUTOMATED COUNT: 14.6 % (ref 11.5–14.5)
EST. GFR  (AFRICAN AMERICAN): 57.5 ML/MIN/1.73 M^2
EST. GFR  (NON AFRICAN AMERICAN): 49.8 ML/MIN/1.73 M^2
GLUCOSE SERPL-MCNC: 118 MG/DL (ref 70–110)
HCT VFR BLD AUTO: 22.3 % (ref 40–54)
HELICOBACTER, RAPID UREA: NEGATIVE
HGB BLD-MCNC: 7.1 G/DL (ref 14–18)
IMM GRANULOCYTES # BLD AUTO: 0.06 K/UL (ref 0–0.04)
IMM GRANULOCYTES NFR BLD AUTO: 0.7 % (ref 0–0.5)
LYMPHOCYTES # BLD AUTO: 1.3 K/UL (ref 1–4.8)
LYMPHOCYTES NFR BLD: 15.2 % (ref 18–48)
MAGNESIUM SERPL-MCNC: 2.6 MG/DL (ref 1.6–2.6)
MCH RBC QN AUTO: 29.8 PG (ref 27–31)
MCHC RBC AUTO-ENTMCNC: 31.8 G/DL (ref 32–36)
MCV RBC AUTO: 94 FL (ref 82–98)
MONOCYTES # BLD AUTO: 0.6 K/UL (ref 0.3–1)
MONOCYTES NFR BLD: 6.8 % (ref 4–15)
NEUTROPHILS # BLD AUTO: 6.6 K/UL (ref 1.8–7.7)
NEUTROPHILS NFR BLD: 75.1 % (ref 38–73)
NRBC BLD-RTO: 0 /100 WBC
NUM UNITS TRANS PACKED RBC: NORMAL
PLATELET # BLD AUTO: 183 K/UL (ref 150–450)
PMV BLD AUTO: 11.2 FL (ref 9.2–12.9)
POTASSIUM SERPL-SCNC: 3.4 MMOL/L (ref 3.5–5.1)
PROT SERPL-MCNC: 5.3 G/DL (ref 6–8.4)
RBC # BLD AUTO: 2.38 M/UL (ref 4.6–6.2)
SODIUM SERPL-SCNC: 144 MMOL/L (ref 136–145)
WBC # BLD AUTO: 8.74 K/UL (ref 3.9–12.7)

## 2021-12-31 PROCEDURE — 63600175 PHARM REV CODE 636 W HCPCS: Performed by: SURGERY

## 2021-12-31 PROCEDURE — 85025 COMPLETE CBC W/AUTO DIFF WBC: CPT | Performed by: SURGERY

## 2021-12-31 PROCEDURE — 63600175 PHARM REV CODE 636 W HCPCS: Performed by: INTERNAL MEDICINE

## 2021-12-31 PROCEDURE — 36430 TRANSFUSION BLD/BLD COMPNT: CPT

## 2021-12-31 PROCEDURE — 83735 ASSAY OF MAGNESIUM: CPT | Performed by: SURGERY

## 2021-12-31 PROCEDURE — 11000001 HC ACUTE MED/SURG PRIVATE ROOM

## 2021-12-31 PROCEDURE — 25000003 PHARM REV CODE 250: Performed by: SURGERY

## 2021-12-31 PROCEDURE — 36415 COLL VENOUS BLD VENIPUNCTURE: CPT | Performed by: SURGERY

## 2021-12-31 PROCEDURE — P9016 RBC LEUKOCYTES REDUCED: HCPCS | Performed by: INTERNAL MEDICINE

## 2021-12-31 PROCEDURE — 80053 COMPREHEN METABOLIC PANEL: CPT | Performed by: SURGERY

## 2021-12-31 PROCEDURE — C9113 INJ PANTOPRAZOLE SODIUM, VIA: HCPCS | Performed by: SURGERY

## 2021-12-31 RX ORDER — HYDROCODONE BITARTRATE AND ACETAMINOPHEN 500; 5 MG/1; MG/1
TABLET ORAL
Status: DISCONTINUED | OUTPATIENT
Start: 2021-12-31 | End: 2022-01-01 | Stop reason: HOSPADM

## 2021-12-31 RX ORDER — FUROSEMIDE 10 MG/ML
20 INJECTION INTRAMUSCULAR; INTRAVENOUS
Status: DISCONTINUED | OUTPATIENT
Start: 2021-12-31 | End: 2022-01-01 | Stop reason: HOSPADM

## 2021-12-31 RX ADMIN — ALUMINUM HYDROXIDE, MAGNESIUM HYDROXIDE, AND SIMETHICONE 30 ML: 200; 200; 20 SUSPENSION ORAL at 02:12

## 2021-12-31 RX ADMIN — PANTOPRAZOLE SODIUM 40 MG: 40 INJECTION, POWDER, FOR SOLUTION INTRAVENOUS at 08:12

## 2021-12-31 RX ADMIN — ALUMINUM HYDROXIDE, MAGNESIUM HYDROXIDE, AND SIMETHICONE 30 ML: 200; 200; 20 SUSPENSION ORAL at 08:12

## 2021-12-31 RX ADMIN — SUCRALFATE 0.5 G: 1 SUSPENSION ORAL at 03:12

## 2021-12-31 RX ADMIN — FUROSEMIDE 20 MG: 10 INJECTION, SOLUTION INTRAMUSCULAR; INTRAVENOUS at 02:12

## 2021-12-31 RX ADMIN — SUCRALFATE 0.5 G: 1 SUSPENSION ORAL at 10:12

## 2021-12-31 RX ADMIN — SUCRALFATE 0.5 G: 1 SUSPENSION ORAL at 08:12

## 2021-12-31 RX ADMIN — SUCRALFATE 0.5 G: 1 SUSPENSION ORAL at 05:12

## 2021-12-31 RX ADMIN — ALUMINUM HYDROXIDE, MAGNESIUM HYDROXIDE, AND SIMETHICONE 30 ML: 200; 200; 20 SUSPENSION ORAL at 06:12

## 2021-12-31 NOTE — SUBJECTIVE & OBJECTIVE
Interval History:    Review of Systems  Objective:     Vital Signs (Most Recent):  Temp: 98.3 °F (36.8 °C) (12/31/21 1200)  Pulse: 63 (12/31/21 1200)  Resp: 20 (12/31/21 1200)  BP: (!) 147/65 (12/31/21 1200)  SpO2: 97 % (12/31/21 1200) Vital Signs (24h Range):  Temp:  [97.7 °F (36.5 °C)-98.4 °F (36.9 °C)] 98.3 °F (36.8 °C)  Pulse:  [61-92] 63  Resp:  [18-22] 20  SpO2:  [95 %-99 %] 97 %  BP: (117-147)/(59-70) 147/65     Weight: 110.2 kg (243 lb)  Body mass index is 34.87 kg/m².    Intake/Output Summary (Last 24 hours) at 12/31/2021 1259  Last data filed at 12/31/2021 0500  Gross per 24 hour   Intake 2140.68 ml   Output --   Net 2140.68 ml      Physical Exam  Constitutional:       Appearance: Normal appearance. He is obese.   HENT:      Head: Normocephalic and atraumatic.      Mouth/Throat:      Mouth: Mucous membranes are dry.   Eyes:      Pupils: Pupils are equal, round, and reactive to light.      Comments: Pale conjunctiva   Cardiovascular:      Rate and Rhythm: Normal rate.      Pulses: Normal pulses.   Pulmonary:      Effort: Pulmonary effort is normal.   Abdominal:      General: Abdomen is flat.   Musculoskeletal:         General: Normal range of motion.      Cervical back: Normal range of motion.   Skin:     General: Skin is warm and dry.   Neurological:      General: No focal deficit present.      Mental Status: He is alert.   Psychiatric:         Mood and Affect: Mood normal.         Thought Content: Thought content normal.         Significant Labs:   All pertinent labs within the past 24 hours have been reviewed.  CBC:   Recent Labs   Lab 12/29/21 2120 12/30/21 0310 12/31/21  0320   WBC 17.26* 10.23 8.74   HGB 9.5* 8.4* 7.1*   HCT 29.2* 25.8* 22.3*    189 183     CMP:   Recent Labs   Lab 12/29/21 2120 12/30/21 0310 12/31/21  0320    141 144   K 4.1 4.0 3.4*    108 113*   CO2 23 25 25   * 136* 118*   BUN 86* 80* 51*   CREATININE 1.8* 1.6* 1.3   CALCIUM 9.3 8.4* 8.6*   PROT 6.5  5.3* 5.3*   ALBUMIN 3.1* 2.5* 2.5*   BILITOT 0.3 0.3 0.3   ALKPHOS 90 72 66   AST 11 8* 10   ALT 26 19 15   ANIONGAP 11 8 6*   EGFRNONAA 33.6* 38.7* 49.8*       Significant Imaging: I have reviewed all pertinent imaging results/findings within the past 24 hours.

## 2021-12-31 NOTE — ASSESSMENT & PLAN NOTE
EGD performed on 12-30-21.   Acute gastric ulcer in the distal antrum found on EGD, likely source of the patient's acute blood loss anemia.   Repeat CBC today at 7.1.   - Plan to transfer 3 units of PRBCs  - PRN Lasix in between transfusions for volume overload.

## 2021-12-31 NOTE — HOSPITAL COURSE
12-30-21: Admitted to hospital, EGD.  12-31-21 CG: Hgb dropped on CBC this AM to 7.1. Will transfuse 3 units of PRBCs after discussion with General Surgery  1-1-22 CG: Patient doing well, able to rest, tolerating full liquid diet. Hgb is stable this Am s/p 3 units PRBC transfusion. Will discharge home today.

## 2021-12-31 NOTE — PROGRESS NOTES
[facility] Medicine  Progress Note    Patient Name: Geraldo Mccallum  MRN: [id number]  Patient Class: IP- Inpatient   Admission Date: 12/29/2021  Length of Stay: 2 days  Attending Physician: Stefan Fleming III, MD  Primary Care Provider: Stefan Fleming III, MD        Subjective:     Principal Problem:Acute gastric ulcer with hemorrhage        HPI:  Patient is a 86-year-old male well known to me with a history of hypertension super ventricular tachycardia obesity pneumonia and pleural effusion who recently was found to have anemia and outpatient testing.  The patient was undergoing diagnostic testing and ultimately began noticing dark stools as well as a bloody bowel movement and then yesterday had an episode of hematemesis.  The patient was seen in the emergency department and we are currently on divert with very little bed availability and discharged home in stable condition.  He saw me for follow-up and the family was having difficulty caring for the patient he was in a wheelchair unable to transfer ultimately we readmitted the patient to the hospital for close monitoring and diagnostic testing.  Patient recently had imaging studies for an abnormal chest x-ray, see results for details.      Overview/Hospital Course:  12-30-21: Admitted to hospital, EGD.  12-31-21 CG: Hgb dropped on CBC this AM to 7.1. Will transfuse 3 units of PRBCs after discussion with General Surgery      Interval History:    Review of Systems  Objective:     Vital Signs (Most Recent):  Temp: 98.3 °F (36.8 °C) (12/31/21 1200)  Pulse: 63 (12/31/21 1200)  Resp: 20 (12/31/21 1200)  BP: (!) 147/65 (12/31/21 1200)  SpO2: 97 % (12/31/21 1200) Vital Signs (24h Range):  Temp:  [97.7 °F (36.5 °C)-98.4 °F (36.9 °C)] 98.3 °F (36.8 °C)  Pulse:  [61-92] 63  Resp:  [18-22] 20  SpO2:  [95 %-99 %] 97 %  BP: (117-147)/(59-70) 147/65     Weight: 110.2 kg (243 lb)  Body mass index is 34.87 kg/m².    Intake/Output Summary (Last 24 hours) at  12/31/2021 1259  Last data filed at 12/31/2021 0500  Gross per 24 hour   Intake 2140.68 ml   Output --   Net 2140.68 ml      Physical Exam  Constitutional:       Appearance: Normal appearance. He is obese.   HENT:      Head: Normocephalic and atraumatic.      Mouth/Throat:      Mouth: Mucous membranes are dry.   Eyes:      Pupils: Pupils are equal, round, and reactive to light.      Comments: Pale conjunctiva   Cardiovascular:      Rate and Rhythm: Normal rate.      Pulses: Normal pulses.   Pulmonary:      Effort: Pulmonary effort is normal.   Abdominal:      General: Abdomen is flat.   Musculoskeletal:         General: Normal range of motion.      Cervical back: Normal range of motion.   Skin:     General: Skin is warm and dry.   Neurological:      General: No focal deficit present.      Mental Status: He is alert.   Psychiatric:         Mood and Affect: Mood normal.         Thought Content: Thought content normal.         Significant Labs:   All pertinent labs within the past 24 hours have been reviewed.  CBC:   Recent Labs   Lab 12/29/21 2120 12/30/21 0310 12/31/21  0320   WBC 17.26* 10.23 8.74   HGB 9.5* 8.4* 7.1*   HCT 29.2* 25.8* 22.3*    189 183     CMP:   Recent Labs   Lab 12/29/21 2120 12/30/21 0310 12/31/21  0320    141 144   K 4.1 4.0 3.4*    108 113*   CO2 23 25 25   * 136* 118*   BUN 86* 80* 51*   CREATININE 1.8* 1.6* 1.3   CALCIUM 9.3 8.4* 8.6*   PROT 6.5 5.3* 5.3*   ALBUMIN 3.1* 2.5* 2.5*   BILITOT 0.3 0.3 0.3   ALKPHOS 90 72 66   AST 11 8* 10   ALT 26 19 15   ANIONGAP 11 8 6*   EGFRNONAA 33.6* 38.7* 49.8*       Significant Imaging: I have reviewed all pertinent imaging results/findings within the past 24 hours.      Assessment/Plan:      Bradycardia  Stable, chronic      AVNRT (AV megan re-entry tachycardia)  stable      ETHAN (acute kidney injury)  Cr is 1.3  Likely 2/2 to hypovolemia, pre-renal       Acute blood loss anemia  Plan as above  Transfuse today  Discharge home  likely in ~24 hours pending repeat CBC     GIB (gastrointestinal bleeding)  EGD performed on 12-30-21.   Acute gastric ulcer in the distal antrum found on EGD, likely source of the patient's acute blood loss anemia.   Repeat CBC today at 7.1.   - Plan to transfer 3 units of PRBCs  - PRN Lasix in between transfusions for volume overload.       VTE Risk Mitigation (From admission, onward)         Ordered     IP VTE LOW RISK PATIENT  Once         12/29/21 1820     Place sequential compression device  Until discontinued         12/29/21 1820                Discharge Planning   YEN:      Code Status: Full Code   Is the patient medically ready for discharge?:     Reason for patient still in hospital (select all that apply): Treatment  Discharge Plan A: Home with family                  Angel Guerrero DO  Department of Hospital Medicine   [facility] Surg

## 2021-12-31 NOTE — PLAN OF CARE
Problem: Adult Inpatient Plan of Care  Goal: Plan of Care Review  Outcome: Ongoing, Progressing     Problem: Fall Injury Risk  Goal: Absence of Fall and Fall-Related Injury  Outcome: Ongoing, Progressing     Problem: Fluid and Electrolyte Imbalance (Acute Kidney Injury/Impairment)  Goal: Fluid and Electrolyte Balance  Outcome: Ongoing, Progressing     Plan of care reviewed with patient and his wife.

## 2022-01-01 VITALS
HEIGHT: 70 IN | OXYGEN SATURATION: 95 % | WEIGHT: 243 LBS | DIASTOLIC BLOOD PRESSURE: 58 MMHG | BODY MASS INDEX: 34.79 KG/M2 | RESPIRATION RATE: 20 BRPM | TEMPERATURE: 98 F | HEART RATE: 65 BPM | SYSTOLIC BLOOD PRESSURE: 150 MMHG

## 2022-01-01 LAB
ALBUMIN SERPL BCP-MCNC: 2.5 G/DL (ref 3.5–5.2)
ALP SERPL-CCNC: 69 U/L (ref 55–135)
ALT SERPL W/O P-5'-P-CCNC: 15 U/L (ref 10–44)
ANION GAP SERPL CALC-SCNC: 7 MMOL/L (ref 8–16)
AST SERPL-CCNC: 14 U/L (ref 10–40)
BASOPHILS # BLD AUTO: 0.04 K/UL (ref 0–0.2)
BASOPHILS NFR BLD: 0.5 % (ref 0–1.9)
BILIRUB SERPL-MCNC: 0.6 MG/DL (ref 0.1–1)
BUN SERPL-MCNC: 29 MG/DL (ref 8–23)
CALCIUM SERPL-MCNC: 8.3 MG/DL (ref 8.7–10.5)
CHLORIDE SERPL-SCNC: 110 MMOL/L (ref 95–110)
CO2 SERPL-SCNC: 26 MMOL/L (ref 23–29)
CREAT SERPL-MCNC: 1.2 MG/DL (ref 0.5–1.4)
DIFFERENTIAL METHOD: ABNORMAL
EOSINOPHIL # BLD AUTO: 0.5 K/UL (ref 0–0.5)
EOSINOPHIL NFR BLD: 6.1 % (ref 0–8)
ERYTHROCYTE [DISTWIDTH] IN BLOOD BY AUTOMATED COUNT: 14.7 % (ref 11.5–14.5)
EST. GFR  (AFRICAN AMERICAN): >60 ML/MIN/1.73 M^2
EST. GFR  (NON AFRICAN AMERICAN): 54.8 ML/MIN/1.73 M^2
GLUCOSE SERPL-MCNC: 104 MG/DL (ref 70–110)
HCT VFR BLD AUTO: 33.1 % (ref 40–54)
HGB BLD-MCNC: 11 G/DL (ref 14–18)
IMM GRANULOCYTES # BLD AUTO: 0.03 K/UL (ref 0–0.04)
IMM GRANULOCYTES NFR BLD AUTO: 0.4 % (ref 0–0.5)
LYMPHOCYTES # BLD AUTO: 1.6 K/UL (ref 1–4.8)
LYMPHOCYTES NFR BLD: 20.4 % (ref 18–48)
MAGNESIUM SERPL-MCNC: 2.5 MG/DL (ref 1.6–2.6)
MCH RBC QN AUTO: 30 PG (ref 27–31)
MCHC RBC AUTO-ENTMCNC: 33.2 G/DL (ref 32–36)
MCV RBC AUTO: 90 FL (ref 82–98)
MONOCYTES # BLD AUTO: 0.6 K/UL (ref 0.3–1)
MONOCYTES NFR BLD: 7.5 % (ref 4–15)
NEUTROPHILS # BLD AUTO: 5.1 K/UL (ref 1.8–7.7)
NEUTROPHILS NFR BLD: 65.1 % (ref 38–73)
NRBC BLD-RTO: 0 /100 WBC
PLATELET # BLD AUTO: 180 K/UL (ref 150–450)
PMV BLD AUTO: 11 FL (ref 9.2–12.9)
POTASSIUM SERPL-SCNC: 3.1 MMOL/L (ref 3.5–5.1)
PROT SERPL-MCNC: 5.5 G/DL (ref 6–8.4)
RBC # BLD AUTO: 3.67 M/UL (ref 4.6–6.2)
SODIUM SERPL-SCNC: 143 MMOL/L (ref 136–145)
WBC # BLD AUTO: 7.76 K/UL (ref 3.9–12.7)

## 2022-01-01 PROCEDURE — 80053 COMPREHEN METABOLIC PANEL: CPT | Performed by: SURGERY

## 2022-01-01 PROCEDURE — 25000003 PHARM REV CODE 250: Performed by: SURGERY

## 2022-01-01 PROCEDURE — 63600175 PHARM REV CODE 636 W HCPCS: Performed by: SURGERY

## 2022-01-01 PROCEDURE — 83735 ASSAY OF MAGNESIUM: CPT | Performed by: SURGERY

## 2022-01-01 PROCEDURE — C9113 INJ PANTOPRAZOLE SODIUM, VIA: HCPCS | Performed by: SURGERY

## 2022-01-01 PROCEDURE — 85025 COMPLETE CBC W/AUTO DIFF WBC: CPT | Performed by: SURGERY

## 2022-01-01 PROCEDURE — 36415 COLL VENOUS BLD VENIPUNCTURE: CPT | Performed by: SURGERY

## 2022-01-01 RX ORDER — SUCRALFATE 1 G/1
1 TABLET ORAL 4 TIMES DAILY
Qty: 20 TABLET | Refills: 0 | Status: SHIPPED | OUTPATIENT
Start: 2022-01-01 | End: 2022-01-06

## 2022-01-01 RX ORDER — PANTOPRAZOLE SODIUM 40 MG/1
40 TABLET, DELAYED RELEASE ORAL DAILY
Qty: 30 TABLET | Refills: 11 | Status: SHIPPED | OUTPATIENT
Start: 2022-01-01 | End: 2022-11-30 | Stop reason: SDUPTHER

## 2022-01-01 RX ORDER — ONDANSETRON 4 MG/1
4 TABLET, FILM COATED ORAL EVERY 6 HOURS PRN
Qty: 12 TABLET | Refills: 0 | Status: SHIPPED | OUTPATIENT
Start: 2022-01-01 | End: 2022-09-30

## 2022-01-01 RX ADMIN — ALUMINUM HYDROXIDE, MAGNESIUM HYDROXIDE, AND SIMETHICONE 30 ML: 200; 200; 20 SUSPENSION ORAL at 08:01

## 2022-01-01 RX ADMIN — SUCRALFATE 0.5 G: 1 SUSPENSION ORAL at 06:01

## 2022-01-01 RX ADMIN — PANTOPRAZOLE SODIUM 40 MG: 40 INJECTION, POWDER, FOR SOLUTION INTRAVENOUS at 08:01

## 2022-01-01 NOTE — DISCHARGE SUMMARY
[facility] Medicine  Discharge Summary      Patient Name: Geraldo Mccallum  MRN: [id number]  Patient Class: IP- Inpatient  Admission Date: 12/29/2021  Hospital Length of Stay: 3 days  Discharge Date and Time:  01/01/2022 9:44 AM  Attending Physician: Stefan Fleming III, MD   Discharging Provider: Angel Guerrero DO  Primary Care Provider: Stefan Fleming III, MD      HPI:   Patient is a 86-year-old male well known to me with a history of hypertension super ventricular tachycardia obesity pneumonia and pleural effusion who recently was found to have anemia and outpatient testing.  The patient was undergoing diagnostic testing and ultimately began noticing dark stools as well as a bloody bowel movement and then yesterday had an episode of hematemesis.  The patient was seen in the emergency department and we are currently on divert with very little bed availability and discharged home in stable condition.  He saw me for follow-up and the family was having difficulty caring for the patient he was in a wheelchair unable to transfer ultimately we readmitted the patient to the hospital for close monitoring and diagnostic testing.  Patient recently had imaging studies for an abnormal chest x-ray, see results for details.      Procedure(s) (LRB):  EGD, WITH CLOSED BIOPSY (N/A)      Hospital Course:   12-30-21: Admitted to hospital, EGD.  12-31-21 CG: Hgb dropped on CBC this AM to 7.1. Will transfuse 3 units of PRBCs after discussion with General Surgery  1-1-22 CG: Patient doing well, able to rest, tolerating full liquid diet. Hgb is stable this Am s/p 3 units PRBC transfusion. Will discharge home today.        Goals of Care Treatment Preferences:  Code Status: Full Code      Consults:   Consults (From admission, onward)        Status Ordering Provider     Inpatient consult to General Surgery  Once        Provider:  Duane Cardenas MD    Completed STEFAN FLEMING III          No new Assessment & Plan  notes have been filed under this hospital service since the last note was generated.  Service: Hospital Medicine    Final Active Diagnoses:    Diagnosis Date Noted POA    PRINCIPAL PROBLEM:  Acute gastric ulcer with hemorrhage [K25.0] 12/30/2021 Yes    GIB (gastrointestinal bleeding) [K92.2] 12/30/2021 Yes    Acute blood loss anemia [D62] 12/30/2021 Yes    ETHAN (acute kidney injury) [N17.9] 12/30/2021 Yes    AVNRT (AV megan re-entry tachycardia) [I47.1] 12/30/2021 Yes    Bradycardia [R00.1] 12/30/2021 Yes      Problems Resolved During this Admission:       Discharged Condition: stable    Disposition: Home or Self Care    Follow Up:   Follow-up Information     Duane Cardenas MD In 3 days.    Specialty: General Surgery  Contact information:  [address] [place] [id number]  [phone]             Stefan Fleming III, MD In 1 week.    Specialty: Internal Medicine  Contact information:  [address] [place] [id number]  [phone]                       Patient Instructions:      CBC auto differential   Standing Status: Future Standing Exp. Date: 03/02/23     Diet full liquid     Lifting restrictions     Notify your health care provider if you experience any of the following:  persistent nausea and vomiting or diarrhea     Activity as tolerated       Significant Diagnostic Studies: Labs:   CMP   Recent Labs   Lab 12/31/21 0320 01/01/22  0308    143   K 3.4* 3.1*   * 110   CO2 25 26   * 104   BUN 51* 29*   CREATININE 1.3 1.2   CALCIUM 8.6* 8.3*   PROT 5.3* 5.5*   ALBUMIN 2.5* 2.5*   BILITOT 0.3 0.6   ALKPHOS 66 69   AST 10 14   ALT 15 15   ANIONGAP 6* 7*   ESTGFRAFRICA 57.5* >60.0   EGFRNONAA 49.8* 54.8*   , CBC   Recent Labs   Lab 12/31/21  0320 12/31/21  0320 01/01/22  0308   WBC 8.74  --  7.76   HGB 7.1*  --  11.0*   HCT 22.3*   < > 33.1*     --  180    < > = values in this interval not displayed.    and INR   Lab Results   Component Value Date    INR 1.0 12/29/2021      Microbiology: Blood Culture No results found for: LABBLOO, Sputum Culture No results found for: GSRESP, RESPIRATORYC, Urine Culture  No results found for: LABURIN and Wound Culture: negative  Radiology: X-Ray: CXR: X-Ray Chest 1 View (CXR): No results found for this visit on 12/29/21.  Cardiac Graphics: Echocardiogram: 2D echo with color flow doppler: No results found for this or any previous visit.    Pending Diagnostic Studies:     Procedure Component Value Units Date/Time    Specimen to Pathology, Surgery General Surgery [[id number]] Collected: 12/30/21 1038    Order Status: Sent Lab Status: In process Updated: 12/30/21 1046    Troponin I [[id number]]     Order Status: Sent Lab Status: No result     Specimen: Blood     Troponin I [[id number]]     Order Status: Sent Lab Status: No result     Specimen: Blood          Medications:  Reconciled Home Medications:      Medication List      START taking these medications    ondansetron 4 MG tablet  Commonly known as: ZOFRAN  Take 1 tablet (4 mg total) by mouth every 6 (six) hours as needed for Nausea.     pantoprazole 40 MG tablet  Commonly known as: PROTONIX  Take 1 tablet (40 mg total) by mouth once daily.     sucralfate 1 gram tablet  Commonly known as: CARAFATE  Take 1 tablet (1 g total) by mouth 4 (four) times daily. for 5 days            Indwelling Lines/Drains at time of discharge:   Lines/Drains/Airways     None                 Time spent on the discharge of patient: 32 minutes         Angel Guerrero DO  Department of Hospital Medicine  [facility]

## 2022-01-01 NOTE — ASSESSMENT & PLAN NOTE
EGD performed on 12-30-21.   Acute gastric ulcer in the distal antrum found on EGD, likely source of the patient's acute blood loss anemia.   Repeat CBC today at 11 up from 7.1 yesterday.   - s/p 3 units of PRBCs  Plan for discharge home today

## 2022-01-01 NOTE — DISCHARGE SUMMARY
[facility]urg  Brief Discharge Note      Procedure(s) (LRB):  EGD, WITH CLOSED BIOPSY (N/A)    OUTCOME: Condition has improved and patient is now ready for discharge.    DISPOSITION: Home or Self Care    FINAL DIAGNOSIS:  Acute gastric ulcer with hemorrhage    FOLLOWUP: With primary care provider and the patient's general surgeon within 1 week.     DISCHARGE INSTRUCTIONS:  Return if you are having any chest pain, shortness of breath, or worsening symptoms.Do not operate any heavy machinery if under the influence of benzodiazepines or narcotic medication.  Advance diet as you can tolerate.       TIME SPENT ON DISCHARGE: Total discharge time was 32 minutes with at least half that time spent in the presence of the patient counseling. The rest of the time was spent reviewing the discharge medications, writing prescriptions, and coordination of care.

## 2022-01-01 NOTE — PROGRESS NOTES
[facility] Medicine  Progress Note    Patient Name: Geraldo Mccallum  MRN: [id number]  Patient Class: IP- Inpatient   Admission Date: 12/29/2021  Length of Stay: 3 days  Attending Physician: Stefan Fleming III, MD  Primary Care Provider: Stefan Fleming III, MD        Subjective:     Principal Problem:Acute gastric ulcer with hemorrhage    2    HPI:  Patient is a 86-year-old male well known to me with a history of hypertension super ventricular tachycardia obesity pneumonia and pleural effusion who recently was found to have anemia and outpatient testing.  The patient was undergoing diagnostic testing and ultimately began noticing dark stools as well as a bloody bowel movement and then yesterday had an episode of hematemesis.  The patient was seen in the emergency department and we are currently on divert with very little bed availability and discharged home in stable condition.  He saw me for follow-up and the family was having difficulty caring for the patient he was in a wheelchair unable to transfer ultimately we readmitted the patient to the hospital for close monitoring and diagnostic testing.  Patient recently had imaging studies for an abnormal chest x-ray, see results for details.      Overview/Hospital Course:  12-30-21: Admitted to hospital, EGD.  12-31-21 CG: Hgb dropped on CBC this AM to 7.1. Will transfuse 3 units of PRBCs after discussion with General Surgery  1-1-22 CG: Patient doing well, able to rest, tolerating full liquid diet. Hgb is stable this Am s/p 3 units PRBC transfusion. Will discharge home today.       Interval History:     Review of Systems   Gastrointestinal: Positive for abdominal pain and blood in stool.   All other systems reviewed and are negative.    Objective:     Vital Signs (Most Recent):  Temp: 98.3 °F (36.8 °C) (01/01/22 0730)  Pulse: 65 (01/01/22 0730)  Resp: 20 (01/01/22 0730)  BP: (!) 150/58 (01/01/22 0730)  SpO2: 95 % (01/01/22 0730) Vital Signs (24h  Range):  Temp:  [97.8 °F (36.6 °C)-99.2 °F (37.3 °C)] 98.3 °F (36.8 °C)  Pulse:  [55-92] 65  Resp:  [14-22] 20  SpO2:  [95 %-100 %] 95 %  BP: (121-156)/(58-80) 150/58     Weight: 110.2 kg (243 lb)  Body mass index is 34.87 kg/m².    Intake/Output Summary (Last 24 hours) at 1/1/2022 0758  Last data filed at 1/1/2022 0500  Gross per 24 hour   Intake 3107.65 ml   Output 400 ml   Net 2707.65 ml      Physical Exam  Constitutional:       Appearance: He is obese.   HENT:      Nose: Nose normal.      Mouth/Throat:      Mouth: Mucous membranes are moist.   Eyes:      Extraocular Movements: Extraocular movements intact.      Pupils: Pupils are equal, round, and reactive to light.   Pulmonary:      Effort: Pulmonary effort is normal.      Breath sounds: Normal breath sounds.   Musculoskeletal:      Cervical back: Normal range of motion and neck supple.   Neurological:      General: No focal deficit present.      Mental Status: He is alert and oriented to person, place, and time.   Psychiatric:         Behavior: Behavior normal.         Thought Content: Thought content normal.         Significant Labs:   All pertinent labs within the past 24 hours have been reviewed.  CBC:   Recent Labs   Lab 12/31/21  0320 01/01/22  0308   WBC 8.74 7.76   HGB 7.1* 11.0*   HCT 22.3* 33.1*    180     CMP:   Recent Labs   Lab 12/31/21  0320 01/01/22  0308    143   K 3.4* 3.1*   * 110   CO2 25 26   * 104   BUN 51* 29*   CREATININE 1.3 1.2   CALCIUM 8.6* 8.3*   PROT 5.3* 5.5*   ALBUMIN 2.5* 2.5*   BILITOT 0.3 0.6   ALKPHOS 66 69   AST 10 14   ALT 15 15   ANIONGAP 6* 7*   EGFRNONAA 49.8* 54.8*       Significant Imaging: I have reviewed all pertinent imaging results/findings within the past 24 hours.      Assessment/Plan:      Bradycardia  Stable, chronic      AVNRT (AV megan re-entry tachycardia)  stable      ETHAN (acute kidney injury)  Cr is 1.3  Likely 2/2 to hypovolemia, pre-renal   Improved, likely near  baseline      Acute blood loss anemia  Plan as above  Discharge home today    GIB (gastrointestinal bleeding)  EGD performed on 12-30-21.   Acute gastric ulcer in the distal antrum found on EGD, likely source of the patient's acute blood loss anemia.   Repeat CBC today at 11 up from 7.1 yesterday.   - s/p 3 units of PRBCs  Plan for discharge home today       VTE Risk Mitigation (From admission, onward)         Ordered     IP VTE LOW RISK PATIENT  Once         12/29/21 1820     Place sequential compression device  Until discontinued         12/29/21 1820                Discharge Planning   YEN:      Code Status: Full Code   Is the patient medically ready for discharge?:     Reason for patient still in hospital (select all that apply): Laboratory test  Discharge Plan A: Home with family          Discharge home today          Angel Guerrero DO  Department of Hospital Medicine   [facility] Surg

## 2022-01-01 NOTE — SUBJECTIVE & OBJECTIVE
Interval History:     Review of Systems   Gastrointestinal: Positive for abdominal pain and blood in stool.   All other systems reviewed and are negative.    Objective:     Vital Signs (Most Recent):  Temp: 98.3 °F (36.8 °C) (01/01/22 0730)  Pulse: 65 (01/01/22 0730)  Resp: 20 (01/01/22 0730)  BP: (!) 150/58 (01/01/22 0730)  SpO2: 95 % (01/01/22 0730) Vital Signs (24h Range):  Temp:  [97.8 °F (36.6 °C)-99.2 °F (37.3 °C)] 98.3 °F (36.8 °C)  Pulse:  [55-92] 65  Resp:  [14-22] 20  SpO2:  [95 %-100 %] 95 %  BP: (121-156)/(58-80) 150/58     Weight: 110.2 kg (243 lb)  Body mass index is 34.87 kg/m².    Intake/Output Summary (Last 24 hours) at 1/1/2022 0758  Last data filed at 1/1/2022 0500  Gross per 24 hour   Intake 3107.65 ml   Output 400 ml   Net 2707.65 ml      Physical Exam  Constitutional:       Appearance: He is obese.   HENT:      Nose: Nose normal.      Mouth/Throat:      Mouth: Mucous membranes are moist.   Eyes:      Extraocular Movements: Extraocular movements intact.      Pupils: Pupils are equal, round, and reactive to light.   Pulmonary:      Effort: Pulmonary effort is normal.      Breath sounds: Normal breath sounds.   Musculoskeletal:      Cervical back: Normal range of motion and neck supple.   Neurological:      General: No focal deficit present.      Mental Status: He is alert and oriented to person, place, and time.   Psychiatric:         Behavior: Behavior normal.         Thought Content: Thought content normal.         Significant Labs:   All pertinent labs within the past 24 hours have been reviewed.  CBC:   Recent Labs   Lab 12/31/21 0320 01/01/22  0308   WBC 8.74 7.76   HGB 7.1* 11.0*   HCT 22.3* 33.1*    180     CMP:   Recent Labs   Lab 12/31/21 0320 01/01/22  0308    143   K 3.4* 3.1*   * 110   CO2 25 26   * 104   BUN 51* 29*   CREATININE 1.3 1.2   CALCIUM 8.6* 8.3*   PROT 5.3* 5.5*   ALBUMIN 2.5* 2.5*   BILITOT 0.3 0.6   ALKPHOS 66 69   AST 10 14   ALT 15 15    ANIONGAP 6* 7*   EGFRNONAA 49.8* 54.8*       Significant Imaging: I have reviewed all pertinent imaging results/findings within the past 24 hours.

## 2022-01-10 LAB — SPECIMEN TO PATHOLOGY - SURGICAL: NORMAL

## 2022-01-18 ENCOUNTER — HOSPITAL ENCOUNTER (OUTPATIENT)
Dept: RADIOLOGY | Facility: HOSPITAL | Age: 87
Discharge: HOME | End: 2022-01-18
Attending: INTERNAL MEDICINE
Payer: MEDICARE

## 2022-01-18 DIAGNOSIS — R09.02 HYPOXEMIA: Primary | ICD-10-CM

## 2022-01-18 DIAGNOSIS — R09.02 HYPOXEMIA: ICD-10-CM

## 2022-01-18 PROCEDURE — 71046 X-RAY EXAM CHEST 2 VIEWS: CPT | Mod: TC

## 2022-02-10 DIAGNOSIS — K25.3 ACUTE GASTRIC ULCER WITHOUT HEMORRHAGE OR PERFORATION: Primary | ICD-10-CM

## 2022-02-10 RX ORDER — LIDOCAINE HYDROCHLORIDE 10 MG/ML
1 INJECTION, SOLUTION EPIDURAL; INFILTRATION; INTRACAUDAL; PERINEURAL ONCE
Status: CANCELLED | OUTPATIENT
Start: 2022-02-10 | End: 2022-02-10

## 2022-02-10 RX ORDER — SODIUM CHLORIDE 0.9 % (FLUSH) 0.9 %
10 SYRINGE (ML) INJECTION
Status: CANCELLED | OUTPATIENT
Start: 2022-02-10

## 2022-02-10 RX ORDER — SODIUM CHLORIDE 9 MG/ML
INJECTION, SOLUTION INTRAVENOUS CONTINUOUS
Status: CANCELLED | OUTPATIENT
Start: 2022-02-10

## 2022-02-16 NOTE — DISCHARGE INSTRUCTIONS
BEFORE THE PROCEDURE:    REPORT ANY CHANGE IN YOUR PHYSICAL CONDITION TO YOUR DOCTOR IMMEDIATELY.  SELF ISOLATE AND CHECK TEMPERATURE DAILY, IF TEMP OVER 100, CALL PHYSICIAN IMMEDIATELY.  TRY TO REFRAIN FROM SMOKING AND ALCOHOL 72 HOURS BEFORE YOUR PROCEDURE.   DO NOT EAT OR DRINK ANYTHING AFTER MIDNIGHT THE NIGHT BEFORE YOUR PROCEDURE.  NO MAKE UP, NAIL POLISH OR JEWELRY.      SOMEONE WILL CALL YOU THE DAY BEFORE YOUR PROCEDURE WITH A CHECK-IN TIME FOR YOUR PROCEDURE.    DAY OF YOUR PROCEDURE:    TAKE BLOOD PRESSURE MEDICATIONS THE MORNING OF YOUR PROCEDURE, WITH SMALL SIPS WATER, AS DIRECTED BY YOUR PHYSICIAN.   DO NOT TAKE ANY DIABETIC MEDICATIONS UNLESS DIRECTED TO DO SO BY YOUR PHYSICIAN.   CONTACT LENSES AND DENTURES MUST BE REMOVED.  A RESPONSIBLE ADULT MUST ACCOMPANY YOU HOME UPON DISCHARGE.   ONLY 1 VISITOR ALLOWED PER ROOM.     COVID TESTING Wednesday February 23, 2022    YOUR THOUGHTS AND OPINIONS HELP US TO BETTER SERVE YOU.     PLEASE PARTICIPATE IN SURVEYS ABOUT YOUR CARE.    THANK YOU FOR CHOOSING [facility].

## 2022-02-17 ENCOUNTER — HOSPITAL ENCOUNTER (OUTPATIENT)
Dept: PREADMISSION TESTING | Facility: HOSPITAL | Age: 87
Discharge: HOME | End: 2022-02-17
Attending: SURGERY
Payer: MEDICARE

## 2022-02-17 VITALS — HEIGHT: 70 IN | BODY MASS INDEX: 35.07 KG/M2 | WEIGHT: 245 LBS

## 2022-02-17 DIAGNOSIS — Z01.818 PRE-OP TESTING: ICD-10-CM

## 2022-02-17 RX ORDER — SUCRALFATE 1 G/1
1 TABLET ORAL 4 TIMES DAILY
Status: ON HOLD | COMMUNITY
End: 2022-02-24 | Stop reason: HOSPADM

## 2022-02-17 RX ORDER — CARVEDILOL 3.12 MG/1
3.12 TABLET ORAL 2 TIMES DAILY
COMMUNITY
Start: 2022-02-04 | End: 2023-02-24

## 2022-02-17 RX ORDER — TRIAMTERENE/HYDROCHLOROTHIAZID 37.5-25 MG
1 TABLET ORAL DAILY
COMMUNITY
Start: 2021-11-22 | End: 2022-02-17

## 2022-02-17 RX ORDER — LEVOTHYROXINE SODIUM 50 UG/1
50 TABLET ORAL DAILY
COMMUNITY
Start: 2021-12-27 | End: 2022-07-29 | Stop reason: SDUPTHER

## 2022-02-17 RX ORDER — FUROSEMIDE 20 MG/1
20 TABLET ORAL 2 TIMES DAILY
COMMUNITY
Start: 2022-01-27 | End: 2023-09-27 | Stop reason: SDUPTHER

## 2022-02-17 RX ORDER — ROSUVASTATIN CALCIUM 5 MG/1
TABLET, COATED ORAL
COMMUNITY
Start: 2022-02-07

## 2022-02-17 RX ORDER — FERROUS GLUCONATE 324(38)MG
324 TABLET ORAL
COMMUNITY
End: 2022-09-30

## 2022-02-17 RX ORDER — ASCORBIC ACID 500 MG
500 TABLET ORAL DAILY
COMMUNITY

## 2022-02-17 RX ORDER — CHOLECALCIFEROL (VITAMIN D3) 25 MCG
1000 TABLET ORAL DAILY
COMMUNITY

## 2022-02-23 ENCOUNTER — ANESTHESIA EVENT (OUTPATIENT)
Dept: ENDOSCOPY | Facility: HOSPITAL | Age: 87
End: 2022-02-23
Payer: MEDICARE

## 2022-02-23 PROBLEM — Z87.11 PERSONAL HISTORY OF GASTRIC ULCER: Status: ACTIVE | Noted: 2022-02-23

## 2022-02-23 NOTE — ANESTHESIA PREPROCEDURE EVALUATION
02/23/2022  Geraldo Mccallum is a 86 y.o., male.      Pre-op Assessment    I have reviewed the Patient Summary Reports.    I have reviewed the NPO Status.   I have reviewed the Medications.     Review of Systems  Anesthesia Hx:  No problems with previous Anesthesia  Denies Family Hx of Anesthesia complications.   Denies Personal Hx of Anesthesia complications.   Social:  Non-Smoker    Cardiovascular:   Hypertension PVD CIS CLEARANCE  HX PSVT,BIGEMINY,SVT   Pulmonary:   Shortness of breath    Renal/:   Chronic Renal Disease, CRI    Hepatic/GI:   PUD,    Neurological:  Neurology Normal    Endocrine:   Hypothyroidism      Lab Results   Component Value Date    WBC 7.24 02/17/2022    HGB 12.1 (L) 02/17/2022    HCT 37.4 (L) 02/17/2022    MCV 92 02/17/2022     02/17/2022     CMP  Sodium   Date Value Ref Range Status   01/18/2022 146 (H) 136 - 145 mmol/L Final     Potassium   Date Value Ref Range Status   01/18/2022 4.2 3.5 - 5.1 mmol/L Final     Chloride   Date Value Ref Range Status   01/18/2022 107 95 - 110 mmol/L Final     CO2   Date Value Ref Range Status   01/18/2022 28 23 - 29 mmol/L Final     Glucose   Date Value Ref Range Status   01/18/2022 100 70 - 110 mg/dL Final     BUN   Date Value Ref Range Status   01/18/2022 23 8 - 23 mg/dL Final     Creatinine   Date Value Ref Range Status   01/18/2022 1.7 (H) 0.5 - 1.4 mg/dL Final     Calcium   Date Value Ref Range Status   01/18/2022 9.0 8.7 - 10.5 mg/dL Final     Total Protein   Date Value Ref Range Status   01/18/2022 6.7 6.0 - 8.4 g/dL Final     Albumin   Date Value Ref Range Status   01/18/2022 3.2 (L) 3.5 - 5.2 g/dL Final     Total Bilirubin   Date Value Ref Range Status   01/18/2022 0.4 0.1 - 1.0 mg/dL Final     Comment:     For infants and newborns, interpretation of results should be based  on gestational age, weight and in agreement with  clinical  observations.    Premature Infant recommended reference ranges:  Up to 24 hours.............<8.0 mg/dL  Up to 48 hours............<12.0 mg/dL  3-5 days..................<15.0 mg/dL  6-29 days.................<15.0 mg/dL    For patients on Eltrombopag therapy, use of Dimension [place] TBIL is   not   recommended.       Alkaline Phosphatase   Date Value Ref Range Status   01/18/2022 111 55 - 135 U/L Final     AST   Date Value Ref Range Status   01/18/2022 16 10 - 40 U/L Final     ALT   Date Value Ref Range Status   01/18/2022 22 10 - 44 U/L Final     Anion Gap   Date Value Ref Range Status   01/18/2022 11 8 - 16 mmol/L Final     eGFR if    Date Value Ref Range Status   01/18/2022 41.6 (A) >60 mL/min/1.73 m^2 Final     eGFR if non    Date Value Ref Range Status   01/18/2022 36.0 (A) >60 mL/min/1.73 m^2 Final     Comment:     Calculation used to obtain the estimated glomerular filtration  rate (eGFR) is the CKD-EPI equation.             Anesthesia Plan  Type of Anesthesia, risks & benefits discussed:    Anesthesia Type: MAC  Intra-op Monitoring Plan: Standard ASA Monitors  Post Op Pain Control Plan: multimodal analgesia  Induction:  IV  Airway Plan: Direct  Informed Consent: Informed consent signed with the Patient and all parties understand the risks and agree with anesthesia plan.  All questions answered.   ASA Score: 4  Day of Surgery Review of History & Physical: I have interviewed and examined the patient. I have reviewed the patient's H&P dated: There are no significant changes.     Ready For Surgery From Anesthesia Perspective.     .

## 2022-02-24 ENCOUNTER — ANESTHESIA (OUTPATIENT)
Dept: ENDOSCOPY | Facility: HOSPITAL | Age: 87
End: 2022-02-24
Payer: MEDICARE

## 2022-02-24 ENCOUNTER — HOSPITAL ENCOUNTER (OUTPATIENT)
Facility: HOSPITAL | Age: 87
Discharge: HOME | End: 2022-02-24
Attending: SURGERY | Admitting: SURGERY
Payer: MEDICARE

## 2022-02-24 VITALS
SYSTOLIC BLOOD PRESSURE: 117 MMHG | DIASTOLIC BLOOD PRESSURE: 56 MMHG | OXYGEN SATURATION: 95 % | HEART RATE: 36 BPM | TEMPERATURE: 99 F | RESPIRATION RATE: 20 BRPM

## 2022-02-24 DIAGNOSIS — K29.00 ACUTE SUPERFICIAL GASTRITIS WITHOUT HEMORRHAGE: Primary | ICD-10-CM

## 2022-02-24 DIAGNOSIS — R00.1 BRADYCARDIA: ICD-10-CM

## 2022-02-24 DIAGNOSIS — K25.3 ACUTE GASTRIC ULCER WITHOUT HEMORRHAGE OR PERFORATION: ICD-10-CM

## 2022-02-24 PROBLEM — Z87.11 PERSONAL HISTORY OF GASTRIC ULCER: Status: RESOLVED | Noted: 2022-02-23 | Resolved: 2022-02-24

## 2022-02-24 LAB
CTP QC/QA: YES
SARS-COV-2 AG RESP QL IA.RAPID: NEGATIVE

## 2022-02-24 PROCEDURE — 37000009 HC ANESTHESIA EA ADD 15 MINS: Performed by: SURGERY

## 2022-02-24 PROCEDURE — 43235 EGD DIAGNOSTIC BRUSH WASH: CPT | Performed by: SURGERY

## 2022-02-24 PROCEDURE — 25000003 PHARM REV CODE 250: Performed by: SURGERY

## 2022-02-24 PROCEDURE — 93005 ELECTROCARDIOGRAM TRACING: CPT

## 2022-02-24 PROCEDURE — 93010 EKG 12-LEAD: ICD-10-PCS | Mod: ,,, | Performed by: INTERNAL MEDICINE

## 2022-02-24 PROCEDURE — 37000008 HC ANESTHESIA 1ST 15 MINUTES: Performed by: SURGERY

## 2022-02-24 PROCEDURE — 93010 ELECTROCARDIOGRAM REPORT: CPT | Mod: ,,, | Performed by: INTERNAL MEDICINE

## 2022-02-24 RX ORDER — SODIUM CHLORIDE 0.9 % (FLUSH) 0.9 %
10 SYRINGE (ML) INJECTION
Status: DISCONTINUED | OUTPATIENT
Start: 2022-02-24 | End: 2022-02-24 | Stop reason: HOSPADM

## 2022-02-24 RX ORDER — PROPOFOL 10 MG/ML
VIAL (ML) INTRAVENOUS
Status: DISCONTINUED | OUTPATIENT
Start: 2022-02-24 | End: 2022-02-24

## 2022-02-24 RX ORDER — SODIUM CHLORIDE 9 MG/ML
INJECTION, SOLUTION INTRAVENOUS CONTINUOUS
Status: DISCONTINUED | OUTPATIENT
Start: 2022-02-24 | End: 2022-02-24 | Stop reason: HOSPADM

## 2022-02-24 RX ORDER — LIDOCAINE HYDROCHLORIDE 10 MG/ML
1 INJECTION, SOLUTION EPIDURAL; INFILTRATION; INTRACAUDAL; PERINEURAL ONCE
Status: DISCONTINUED | OUTPATIENT
Start: 2022-02-24 | End: 2022-02-24 | Stop reason: HOSPADM

## 2022-02-24 RX ADMIN — TOPICAL ANESTHETIC 2 EACH: 200 SPRAY DENTAL; PERIODONTAL at 09:02

## 2022-02-24 RX ADMIN — Medication 50 MG: at 09:02

## 2022-02-24 RX ADMIN — Medication 25 MG: at 09:02

## 2022-02-24 RX ADMIN — SODIUM CHLORIDE: 0.9 INJECTION, SOLUTION INTRAVENOUS at 09:02

## 2022-02-24 NOTE — PLAN OF CARE
Dr Parker aware of pts heart rate 35-60's, order for ekg noted. Britton from resp. Notified at 0650.

## 2022-02-24 NOTE — H&P
[facility]  General Surgery  History & Physical    Patient Name: Geraldo Mccallum  MRN: [id number]  Admission Date: (Not on file)  Attending Physician: Duane Cardenas MD   Primary Care Provider: Stefan Fleming III, MD    Patient information was obtained from patient, past medical records and ER records.     Subjective:     Chief Complaint/Reason for Admission:  Past history of bleeding gastric ulcer    History of Present Illness:  Patient is a 86 y.o. male who on late December 2021 was admitted to the hospital because of anemia and upper GI bleeding.  An EGD was done that showed a gastric ulcer which was not bleeding at the time of the procedure.  He has been treated intensively with PPI and antacids and came to the office to be scheduled for an EGD to confirm resolution of the ulcer.  His last hemoglobin was 12.3.  Denies any bleeding episodes.  Should be noted that biopsy of the edge of the ulcer showed only inflammatory tissue.    No current facility-administered medications on file prior to encounter.     Current Outpatient Medications on File Prior to Encounter   Medication Sig    ondansetron (ZOFRAN) 4 MG tablet Take 1 tablet (4 mg total) by mouth every 6 (six) hours as needed for Nausea.    pantoprazole (PROTONIX) 40 MG tablet Take 1 tablet (40 mg total) by mouth once daily.       Review of patient's allergies indicates:  No Known Allergies    Past Medical History:   Diagnosis Date    Bleeding ulcer     Encounter for blood transfusion 2021    X3    History of shortness of breath     Hypertension     Skin cancer 02/2022    RIGHT FACE    Thyroid disease      Past Surgical History:   Procedure Laterality Date    SKIN CANCER EXCISION Right 2022    RIGHT FACE    TOTAL KNEE ARTHROPLASTY Bilateral 2001     Family History     Problem Relation (Age of Onset)    Cancer Mother (83), Father    Heart attack Father (69)    Heart disease Sister (90), Sister (88)        Tobacco Use    Smoking status:  Never Smoker    Smokeless tobacco: Never Used   Substance and Sexual Activity    Alcohol use: Yes     Comment: OCC    Drug use: Never    Sexual activity: Not on file     Review of Systems   Gastrointestinal:        Past history of bleeding gastric ulcer now under treatment   All other systems reviewed and are negative.    Objective:     Vital Signs (Most Recent):    Vital Signs (24h Range):           There is no height or weight on file to calculate BMI.    Physical Exam  Constitutional:       General: He is not in acute distress.     Appearance: Normal appearance. He is obese.   HENT:      Head: Normocephalic.      Nose: Nose normal.      Mouth/Throat:      Mouth: Mucous membranes are moist.   Eyes:      Extraocular Movements: Extraocular movements intact.   Cardiovascular:      Rate and Rhythm: Normal rate and regular rhythm.   Pulmonary:      Effort: Pulmonary effort is normal.   Abdominal:      General: Abdomen is flat. There is no distension.      Palpations: Abdomen is soft. There is no mass.      Tenderness: There is no abdominal tenderness.   Musculoskeletal:         General: Normal range of motion.      Cervical back: Neck supple.   Lymphadenopathy:      Cervical: No cervical adenopathy.   Skin:     General: Skin is warm and dry.      Coloration: Skin is not jaundiced.   Neurological:      General: No focal deficit present.      Mental Status: He is alert and oriented to person, place, and time.   Psychiatric:         Mood and Affect: Mood normal.         Behavior: Behavior normal.         Significant Labs:  I have reviewed all pertinent lab results within the past 24 hours.    Significant Diagnostics:  None    Assessment/Plan:     Active Diagnoses:    Diagnosis Date Noted POA    PRINCIPAL PROBLEM:  Personal history of gastric ulcer [Z87.11] 02/23/2022 Not Applicable      Problems Resolved During this Admission:     VTE Risk Mitigation (From admission, onward)    None          Duane Cardenas  MD  General Surgery  [facility]

## 2022-02-24 NOTE — OP NOTE
[facility] - Endoscopy  EGD Procedure  Operative Note    SUMMARY     Date of Procedure: 2/24/2022     Procedure: Procedure(s) (LRB):  EGD (ESOPHAGOGASTRODUODENOSCOPY) (N/A)    Surgeon(s) and Role:     * Duane Cardenas MD - Primary    Assisting Surgeon: None    Patient location: PACU    Pre-Operative Diagnosis: Acute gastric ulcer without hemorrhage or perforation [K25.3]    Post-Operative Diagnosis: Post-Op Diagnosis Codes:     * Gastritis, acute [K29.00]     Indications:  History of bleeding gastric ulcer     ASA Score: IV    Procedure:                  The patient was brought in to the endoscopy suite where the risks, benefits, and alternatives of the procedure were described.  The patient was given the opportunity to ask questions and then signed informed consent. Patient was positioned in the left lateral decubitus position, continuous monitoring was initiated, and supplemental oxygen was provided via nasal cannula.  Bite block was placed.  Adequate sedation was achieved with the above mentioned medications and then titrated during the entire procedure.  Under direct visualization the gastroscope was introduced through the oropharynx in to the esophagus.  The scope was then advanced in to the stomach and second portion of the duodenum.  Scope was then withdrawn and the mucosa was carefully examined.  The entire gastric mucosa was examined, including the fundus with retroflexion.  Air was evacuated from the stomach and the scope was withdrawn in to the esophagus.  The entire esophageal mucosa was examined.  The patient tolerated the procedure well and was able to be transferred to the recovery area in stable condition.    Findings:                 Esophagus:  Upon insertion of the scope the vocal cords were seen and they appeared unremarkable.  We advanced easily down the esophagus towards the stomach.  The Z-line was sharply delineated at 40 cm.  There was no evidence of ulceration.                      Stomach:   We entered the stomach and advanced towards the pylorus.  At the area of the previous ulcer, which is completely healed, there is some very localized inflammation which is residual but no other abnormality.  A retroflexed view of the cardia showed no proximal gastric abnormalities.                    Duodenum:  Duodenum was unremarkable     Specimens:   Specimen (24h ago, onward)            None            Estimated Blood Loss (EBL): * No values recorded between 2/24/2022 12:00 AM and 2/24/2022  9:46 AM *     Complications: No     Diagnostic Impression:  1. Past history of gastric ulcer, healed 2. Residual mild acute gastritis     Recommendations: Discharge patient to home.    Disposition: PACU - hemodynamically stable.     Attestation: I performed the procedure.        Follow Up:             Future Appointments   Date Time Provider Department Center   3/3/2022  8:00 AM [facility] CT1 [facility]SCAN [facility] Tomas Birriel, MD  2/24/2022

## 2022-02-24 NOTE — PLAN OF CARE
EKG done per Melina. Reviewed by Dr Parker. Awaiting KAYLEE Sands from [facility] to review EKG and contact Dr Parker.

## 2022-02-24 NOTE — TRANSFER OF CARE
Anesthesia Transfer of Care Note    Patient: Geraldo Mccallum    Procedure(s) Performed: Procedure(s) (LRB):  EGD (ESOPHAGOGASTRODUODENOSCOPY) (N/A)    Patient location: PACU    Anesthesia Type: MAC    Transport from OR: Transported from OR on room air with adequate spontaneous ventilation    Post pain: adequate analgesia    Post assessment: no apparent anesthetic complications    Post vital signs: stable    Level of consciousness: awake    Nausea/Vomiting: no nausea/vomiting    Complications: none    Transfer of care protocol was followed      Last vitals:   145/71  16 RR  76 HR  96% O2 SAT

## 2022-02-24 NOTE — DISCHARGE INSTRUCTIONS
No driving for 24 hours notify dr hayden of any problems or concerns     follow up with your family  as scheduled

## 2022-02-24 NOTE — DISCHARGE SUMMARY
[place] - Endoscopy  Discharge Note  Short Stay    Procedure(s) (LRB):  EGD (ESOPHAGOGASTRODUODENOSCOPY) (N/A)    OUTCOME: Patient tolerated treatment/procedure well without complication and is now ready for discharge.    DISPOSITION: Home or Self Care    FINAL DIAGNOSIS:  1. Personal history of gastric ulcer, healed 2. Residual mild acute gastritis    FOLLOWUP: Patient is to follow-up with his family doctor as scheduled    DISCHARGE INSTRUCTIONS:  No discharge procedures on file.     TIME SPENT ON DISCHARGE:  15 minutes

## 2022-02-24 NOTE — ANESTHESIA POSTPROCEDURE EVALUATION
Anesthesia Post Evaluation    Patient: Geraldo Mccallum    Procedure(s) Performed: Procedure(s) (LRB):  EGD (ESOPHAGOGASTRODUODENOSCOPY) (N/A)    Final Anesthesia Type: MAC      Patient location during evaluation: OPS  Patient participation: Yes- Able to Participate  Level of consciousness: awake  Post-procedure vital signs: reviewed and stable  Pain management: adequate  Airway patency: patent    PONV status at discharge: No PONV  Anesthetic complications: no      Cardiovascular status: blood pressure returned to baseline  Respiratory status: spontaneous ventilation  Hydration status: euvolemic  Follow-up not needed.          Vitals Value Taken Time   /56 02/24/22 1009   Temp 37 c 02/24/22 1055   Pulse 36 02/24/22 1009   Resp 20 02/24/22 1009   SpO2 95 % 02/24/22 1009         No case tracking events are documented in the log.      Pain/Salena Score: Salena Score: 10 (2/24/2022 10:11 AM)

## 2022-03-03 ENCOUNTER — HOSPITAL ENCOUNTER (OUTPATIENT)
Dept: RADIOLOGY | Facility: HOSPITAL | Age: 87
Discharge: HOME | End: 2022-03-03
Attending: INTERNAL MEDICINE
Payer: MEDICARE

## 2022-03-03 DIAGNOSIS — R91.1 PULMONARY NODULE: ICD-10-CM

## 2022-03-03 PROCEDURE — 71250 CT THORAX DX C-: CPT | Mod: TC

## 2022-03-29 PROBLEM — I47.10 PAROXYSMAL SUPRAVENTRICULAR TACHYCARDIA: Status: ACTIVE | Noted: 2021-12-30

## 2022-04-05 ENCOUNTER — HOSPITAL ENCOUNTER (OUTPATIENT)
Dept: RADIOLOGY | Facility: HOSPITAL | Age: 87
Discharge: HOME | End: 2022-04-05
Attending: INTERNAL MEDICINE
Payer: MEDICARE

## 2022-04-05 DIAGNOSIS — R06.02 SOB (SHORTNESS OF BREATH): ICD-10-CM

## 2022-04-05 DIAGNOSIS — R09.89 CHEST CONGESTION: ICD-10-CM

## 2022-04-05 PROCEDURE — 71046 X-RAY EXAM CHEST 2 VIEWS: CPT | Mod: TC

## 2022-09-30 PROBLEM — L02.232: Status: ACTIVE | Noted: 2022-09-30

## 2022-09-30 PROBLEM — E88.810 METABOLIC SYNDROME: Status: ACTIVE | Noted: 2022-09-30

## 2022-09-30 PROBLEM — B37.2 CANDIDIASIS OF SKIN AND NAILS: Status: ACTIVE | Noted: 2022-09-30

## 2022-09-30 PROBLEM — R91.8 LUNG MASS: Status: ACTIVE | Noted: 2021-12-09

## 2023-01-03 ENCOUNTER — HOSPITAL ENCOUNTER (OUTPATIENT)
Dept: RADIOLOGY | Facility: HOSPITAL | Age: 88
Discharge: HOME | End: 2023-01-03
Attending: INTERNAL MEDICINE
Payer: MEDICARE

## 2023-01-03 DIAGNOSIS — M54.50 LUMBAR BACK PAIN: ICD-10-CM

## 2023-01-03 DIAGNOSIS — N17.9 AKI (ACUTE KIDNEY INJURY): ICD-10-CM

## 2023-01-03 PROCEDURE — 74018 RADEX ABDOMEN 1 VIEW: CPT | Mod: TC

## 2023-01-03 PROCEDURE — 72100 X-RAY EXAM L-S SPINE 2/3 VWS: CPT | Mod: TC

## 2023-02-16 ENCOUNTER — HOSPITAL ENCOUNTER (OUTPATIENT)
Dept: RADIOLOGY | Facility: HOSPITAL | Age: 88
Discharge: HOME | End: 2023-02-16
Attending: INTERNAL MEDICINE
Payer: MEDICARE

## 2023-02-16 DIAGNOSIS — R05.1 ACUTE COUGH: ICD-10-CM

## 2023-02-16 PROBLEM — I20.89 OTHER FORMS OF ANGINA PECTORIS: Status: ACTIVE | Noted: 2023-02-16

## 2023-02-16 PROBLEM — E66.01 SEVERE OBESITY (BMI 35.0-39.9) WITH COMORBIDITY: Status: ACTIVE | Noted: 2023-02-16

## 2023-02-16 PROBLEM — R91.8 LUNG MASS: Status: RESOLVED | Noted: 2021-12-09 | Resolved: 2023-02-16

## 2023-02-16 PROBLEM — I77.819 AORTIC ECTASIA, UNSPECIFIED SITE: Status: ACTIVE | Noted: 2023-02-16

## 2023-02-16 PROCEDURE — 71046 X-RAY EXAM CHEST 2 VIEWS: CPT | Mod: TC

## 2023-02-20 PROBLEM — H25.11 AGE-RELATED NUCLEAR CATARACT, RIGHT EYE: Status: ACTIVE | Noted: 2023-02-20

## 2023-02-20 PROBLEM — H25.011 CORTICAL AGE-RELATED CATARACT, RIGHT EYE: Status: ACTIVE | Noted: 2023-02-20

## 2023-02-24 VITALS — HEIGHT: 70 IN | BODY MASS INDEX: 36.51 KG/M2 | WEIGHT: 255 LBS

## 2023-02-24 RX ORDER — CARVEDILOL 3.12 MG/1
3.12 TABLET ORAL 2 TIMES DAILY WITH MEALS
COMMUNITY

## 2023-02-27 ENCOUNTER — HOSPITAL ENCOUNTER (OUTPATIENT)
Dept: PREADMISSION TESTING | Facility: HOSPITAL | Age: 88
Discharge: HOME | End: 2023-02-27
Payer: MEDICARE

## 2023-02-27 ENCOUNTER — ANESTHESIA EVENT (OUTPATIENT)
Dept: SURGERY | Facility: HOSPITAL | Age: 88
End: 2023-02-27
Payer: MEDICARE

## 2023-02-27 NOTE — ANESTHESIA PREPROCEDURE EVALUATION
02/27/2023  Geraldo Mccallum is a 87 y.o., male.      Pre-op Assessment    I have reviewed the Patient Summary Reports.    I have reviewed the NPO Status.   I have reviewed the Medications.     Review of Systems  Anesthesia Hx:  No problems with previous Anesthesia  Denies Family Hx of Anesthesia complications.   Denies Personal Hx of Anesthesia complications.   Social:  Non-Smoker    Cardiovascular:   Hypertension, well controlled Dysrhythmias atrial fibrillation HXD PST.SLY,AV BLOCK   Pulmonary:   Pneumonia HX LUNG MASS> PLEURAL EFFUSION   Renal/:   renal calculi    Hepatic/GI:   PUD,    Neurological:  Neurology Normal    Endocrine:  Endocrine Normal        Physical Exam  General: Well nourished    Airway:  Mallampati: III / III  Mouth Opening: Normal  TM Distance: Normal  Tongue: Normal  Neck ROM: Normal ROM    Dental:  Intact    Chest/Lungs:  Clear to auscultation    Heart:  Rate: Normal  Rhythm: Regular Rhythm  Sounds: Normal        Anesthesia Plan  Type of Anesthesia, risks & benefits discussed:    Anesthesia Type: MAC  Intra-op Monitoring Plan: Standard ASA Monitors  Post Op Pain Control Plan: multimodal analgesia  Induction:  IV  Airway Plan: Direct  Informed Consent: Informed consent signed with the Patient and all parties understand the risks and agree with anesthesia plan.  All questions answered.   ASA Score: 4  Day of Surgery Review of History & Physical: I have interviewed and examined the patient. I have reviewed the patient's H&P dated: There are no significant changes.     Ready For Surgery From Anesthesia Perspective.     .

## 2023-02-28 ENCOUNTER — HOSPITAL ENCOUNTER (OUTPATIENT)
Facility: HOSPITAL | Age: 88
Discharge: HOME | End: 2023-02-28
Attending: OPHTHALMOLOGY | Admitting: OPHTHALMOLOGY
Payer: MEDICARE

## 2023-02-28 ENCOUNTER — ANESTHESIA (OUTPATIENT)
Dept: SURGERY | Facility: HOSPITAL | Age: 88
End: 2023-02-28
Payer: MEDICARE

## 2023-02-28 VITALS
SYSTOLIC BLOOD PRESSURE: 156 MMHG | HEART RATE: 53 BPM | RESPIRATION RATE: 20 BRPM | OXYGEN SATURATION: 97 % | DIASTOLIC BLOOD PRESSURE: 70 MMHG | TEMPERATURE: 98 F

## 2023-02-28 DIAGNOSIS — H25.11 AGE-RELATED NUCLEAR CATARACT, RIGHT EYE: ICD-10-CM

## 2023-02-28 PROBLEM — H25.011 CORTICAL AGE-RELATED CATARACT, RIGHT EYE: Status: RESOLVED | Noted: 2023-02-20 | Resolved: 2023-02-28

## 2023-02-28 PROCEDURE — 37000009 HC ANESTHESIA EA ADD 15 MINS: Performed by: OPHTHALMOLOGY

## 2023-02-28 PROCEDURE — 71000015 HC POSTOP RECOV 1ST HR: Performed by: OPHTHALMOLOGY

## 2023-02-28 PROCEDURE — 36000707: Performed by: OPHTHALMOLOGY

## 2023-02-28 PROCEDURE — 37000008 HC ANESTHESIA 1ST 15 MINUTES: Performed by: OPHTHALMOLOGY

## 2023-02-28 PROCEDURE — 63600175 PHARM REV CODE 636 W HCPCS: Performed by: OPHTHALMOLOGY

## 2023-02-28 PROCEDURE — 36000706: Performed by: OPHTHALMOLOGY

## 2023-02-28 PROCEDURE — V2632 POST CHMBR INTRAOCULAR LENS: HCPCS | Performed by: OPHTHALMOLOGY

## 2023-02-28 PROCEDURE — 25000003 PHARM REV CODE 250: Performed by: OPHTHALMOLOGY

## 2023-02-28 PROCEDURE — 25000242 PHARM REV CODE 250 ALT 637 W/ HCPCS: Performed by: ANESTHESIOLOGY

## 2023-02-28 RX ORDER — MIDAZOLAM HCL 2 MG/ML
4 SYRUP ORAL ONCE
Status: COMPLETED | OUTPATIENT
Start: 2023-02-28 | End: 2023-02-28

## 2023-02-28 RX ORDER — PROPARACAINE HYDROCHLORIDE 5 MG/ML
1 SOLUTION/ DROPS OPHTHALMIC
Status: COMPLETED | OUTPATIENT
Start: 2023-02-28 | End: 2023-02-28

## 2023-02-28 RX ORDER — TROPICAMIDE 10 MG/ML
1 SOLUTION/ DROPS OPHTHALMIC
Status: COMPLETED | OUTPATIENT
Start: 2023-02-28 | End: 2023-02-28

## 2023-02-28 RX ORDER — MOXIFLOXACIN 5 MG/ML
SOLUTION/ DROPS OPHTHALMIC
Status: DISCONTINUED | OUTPATIENT
Start: 2023-02-28 | End: 2023-02-28 | Stop reason: HOSPADM

## 2023-02-28 RX ORDER — PROPARACAINE HYDROCHLORIDE 5 MG/ML
SOLUTION/ DROPS OPHTHALMIC
Status: DISCONTINUED | OUTPATIENT
Start: 2023-02-28 | End: 2023-02-28 | Stop reason: HOSPADM

## 2023-02-28 RX ORDER — PHENYLEPHRINE HYDROCHLORIDE 25 MG/ML
1 SOLUTION/ DROPS OPHTHALMIC
Status: COMPLETED | OUTPATIENT
Start: 2023-02-28 | End: 2023-02-28

## 2023-02-28 RX ORDER — MOXIFLOXACIN 5 MG/ML
1 SOLUTION/ DROPS OPHTHALMIC
Status: COMPLETED | OUTPATIENT
Start: 2023-02-28 | End: 2023-02-28

## 2023-02-28 RX ADMIN — MOXIFLOXACIN 1 DROP: 5 SOLUTION/ DROPS OPHTHALMIC at 08:02

## 2023-02-28 RX ADMIN — PROPARACAINE HYDROCHLORIDE 1 DROP: 5 SOLUTION/ DROPS OPHTHALMIC at 08:02

## 2023-02-28 RX ADMIN — PHENYLEPHRINE HYDROCHLORIDE 1 DROP: 25 SOLUTION/ DROPS OPHTHALMIC at 08:02

## 2023-02-28 RX ADMIN — TROPICAMIDE 1 DROP: 10 SOLUTION/ DROPS OPHTHALMIC at 08:02

## 2023-02-28 RX ADMIN — MIDAZOLAM HYDROCHLORIDE 4 MG: 2 SYRUP ORAL at 08:02

## 2023-02-28 NOTE — DISCHARGE SUMMARY
[facility]  Brief Discharge Note    Patient Name:  Geraldo Mccallum   MRN:  [id number]    :  1935   Admission Date:  2023    Discharge Date:  2023   Attending Physician: Maldonado Garrison MD     Procedure:  PHACOEMULSIFICATION, CATARACT (Right)    OUTCOME: Patient tolerated procedure well without complication and is now ready for discharge.    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                                                       Only remove eye shield to apply eye drops.                                                       Follow the post-op eye instructions given from the clinic.

## 2023-02-28 NOTE — TRANSFER OF CARE
Anesthesia Transfer of Care Note    Patient: Geraldo Mccallum    Procedure(s) Performed: Procedure(s) (LRB):  PHACOEMULSIFICATION, CATARACT, WITH IOL INSERTION (Right)    Patient location: Other: OR C    Anesthesia Type: MAC    Transport from OR: Transported from OR on room air with adequate spontaneous ventilation    Post pain: adequate analgesia    Post assessment: no apparent anesthetic complications    Post vital signs: stable    Level of consciousness: awake    Nausea/Vomiting: no nausea/vomiting    Complications: none    Transfer of care protocol was followed      Last vitals:   HR 49  RR 16  T 36.8  SPO2 100  /74

## 2023-02-28 NOTE — PLAN OF CARE
1 DROP OF PREDNISOLONE/BROMFENAC OPHTHALMIC SOLUTION ADMINISTERED IN RIGHT EYE @ 1016  BY BENTLEY GRACIA RN

## 2023-02-28 NOTE — DISCHARGE INSTRUCTIONS
-RELAX AT HOME FOR THE REST OF THE DAY. YOU MAY EAT ANYTHING YOU LIKE.   -PLAN TO SEE DR GONZALES TOMORROW AT THE OFFICE. BRING ALL EYE DROPS WITH YOU TO THIS APPOINTMENT.    -PUT EYE DROPS IN THE AFFECTED EYE AS PER DR GONZALES'S EYE DROP SCHEDULED. -USE IN ANY ORDER, WAIT 5 MINUTES BETWEEN DROPS.  -REMOVE EYE SHIELD WHILE PUTTING EYE DROPS IN.    -DO NOT RUB YOUR EYE!!  -DO NOT EXERT YOURSELF - NO HEAVY LIFTING, RUNNING OR SWIMMING FOR 1 WEEK.  -YOU MAY SHOWER, BUT DON'T ALLOW WATER INTO YOUR EYE FOR 1 WEEK.  -WEAR PROTECTIVE SUNGLASSES DURING THE DAY AND AN EYE SHIELD AT NIGHT FOR 1 WEEK.    NO DRINKING ALCOHOL OR DRIVING FOR 24 HOURS.    -IF YOU HAVE PAIN, REDNESS OR DECREASED VISION, CALL DR GONZALES'S OFFICE -[phone] OR IF AFTER HOURS CALL [phone].

## 2023-02-28 NOTE — OP NOTE
[facility]  Operative Note    Date:  2023     Patient:  Geraldo Mccallum   MRN:  [id number]   :  1935    Surgeon:  Maldonado Garrison MD    PREOPERATIVE DIAGNOSIS:  Visually significant age-related nuclear and cortical cataract, right eye.    POSTOPERATIVE DIAGNOSIS:  Visually significant age-related nuclear and cortical cataract, right eye.    PROCEDURE:  Phacoemulsification of cataract with posterior chamber intraocular lens implant, right eye.    ANESTHESIA:  Topical with MAC.      COMPLICATIONS:  None.    ESTIMATED BLOOD LOSS:  Minimal.    PROCEDURE IN DETAIL:  The patient presented to our clinic complaining of increasing difficulties with activities of daily living due to a visually significant cataract in the right eye.  After risks, benefits, and alternatives were explained to the patient, the patient agreed to proceed with the above procedure.  The patient was brought to the operating room and received topical anesthetic to the right eye and was then prepped and draped in the usual sterile fashion.  The right eye was first entered at 11:00 o'clock paracentesis site followed by intracameral lidocaine, and Viscoat.  The primary surgical site was then created at 8:30 o'clock followed by continuous capsulotomy, hydrodissection, and phacoemulsification of the cataract.  Residual cortical material was removed using the automated irrigation-aspiration technique.  Provisc was injected into the posterior chamber and an Kumar SY60WF 15.5 diopter lens was placed in the bag without difficulty.  Residual Provisc was removed using the automated irrigation-aspiration technique.  The eye was re-pressurized using BSS solution, and both the paracentesis and primary surgical site were demonstrated to be watertight at the end of the case with Weck-Peyton manipulation.  Vigamox, Pred Forte, and Prolensa were placed in the eye followed by placement of a Patiño shield.  The patient tolerated the procedure well and  was taken to the recovery room in good and stable condition.  The patient was instructed to refrain from any heavy lifting, bending, stooping, or straining activities and to follow-up in the morning for routine post-operative care with Dr. Maldonado Garrison.

## 2023-02-28 NOTE — ANESTHESIA POSTPROCEDURE EVALUATION
Anesthesia Post Evaluation    Patient: Geraldo Mccallum    Procedure(s) Performed: Procedure(s) (LRB):  PHACOEMULSIFICATION, CATARACT, WITH IOL INSERTION (Right)    Final Anesthesia Type: MAC      Patient location during evaluation: OPS  Patient participation: Yes- Able to Participate  Level of consciousness: awake  Post-procedure vital signs: reviewed and stable  Pain management: adequate  Airway patency: patent    PONV status at discharge: No PONV  Anesthetic complications: no      Cardiovascular status: blood pressure returned to baseline  Respiratory status: spontaneous ventilation  Hydration status: euvolemic  Follow-up not needed.          Vitals Value Taken Time   /70 02/28/23 1108   Temp 36.5 °C (97.7 °F) 02/28/23 1028   Pulse 53 02/28/23 1108   Resp 20 02/28/23 1028   SpO2 97 % 02/28/23 1108         No case tracking events are documented in the log.      Pain/Salena Score: Salena Score: 10 (2/28/2023 10:28 AM)

## 2023-03-06 ENCOUNTER — HOSPITAL ENCOUNTER (OUTPATIENT)
Dept: RADIOLOGY | Facility: HOSPITAL | Age: 88
Discharge: HOME | End: 2023-03-06
Attending: INTERNAL MEDICINE
Payer: MEDICARE

## 2023-03-06 DIAGNOSIS — R91.1 SOLITARY PULMONARY NODULE: ICD-10-CM

## 2023-03-06 PROCEDURE — 71250 CT THORAX DX C-: CPT | Mod: TC

## 2023-03-09 ENCOUNTER — LAB VISIT (OUTPATIENT)
Dept: LAB | Facility: HOSPITAL | Age: 88
End: 2023-03-09
Attending: INTERNAL MEDICINE
Payer: MEDICARE

## 2023-03-09 DIAGNOSIS — D50.9 IRON DEFICIENCY ANEMIA, UNSPECIFIED IRON DEFICIENCY ANEMIA TYPE: ICD-10-CM

## 2023-03-09 DIAGNOSIS — I47.10 PAROXYSMAL SUPRAVENTRICULAR TACHYCARDIA: ICD-10-CM

## 2023-03-09 DIAGNOSIS — Z12.5 PROSTATE CANCER SCREENING: ICD-10-CM

## 2023-03-09 DIAGNOSIS — I10 PRIMARY HYPERTENSION: ICD-10-CM

## 2023-03-09 LAB
ALBUMIN SERPL BCP-MCNC: 3.1 G/DL (ref 3.5–5.2)
ALP SERPL-CCNC: 141 U/L (ref 55–135)
ALT SERPL W/O P-5'-P-CCNC: 23 U/L (ref 10–44)
ANION GAP SERPL CALC-SCNC: 3 MMOL/L (ref 8–16)
AST SERPL-CCNC: 13 U/L (ref 10–40)
BASOPHILS # BLD AUTO: 0.03 K/UL (ref 0–0.2)
BASOPHILS NFR BLD: 0.5 % (ref 0–1.9)
BILIRUB SERPL-MCNC: 0.6 MG/DL (ref 0.1–1)
BUN SERPL-MCNC: 24 MG/DL (ref 8–23)
CALCIUM SERPL-MCNC: 8.5 MG/DL (ref 8.7–10.5)
CHLORIDE SERPL-SCNC: 107 MMOL/L (ref 95–110)
CO2 SERPL-SCNC: 31 MMOL/L (ref 23–29)
COMPLEXED PSA SERPL-MCNC: 0.54 NG/ML (ref 0–4)
CREAT SERPL-MCNC: 1.4 MG/DL (ref 0.5–1.4)
DIFFERENTIAL METHOD: ABNORMAL
EOSINOPHIL # BLD AUTO: 0.2 K/UL (ref 0–0.5)
EOSINOPHIL NFR BLD: 3.5 % (ref 0–8)
ERYTHROCYTE [DISTWIDTH] IN BLOOD BY AUTOMATED COUNT: 14.2 % (ref 11.5–14.5)
EST. GFR  (NO RACE VARIABLE): 48.9 ML/MIN/1.73 M^2
FERRITIN SERPL-MCNC: 83 NG/ML (ref 20–300)
GLUCOSE SERPL-MCNC: 98 MG/DL (ref 70–110)
HCT VFR BLD AUTO: 42.6 % (ref 40–54)
HGB BLD-MCNC: 13.9 G/DL (ref 14–18)
IMM GRANULOCYTES # BLD AUTO: 0.01 K/UL (ref 0–0.04)
IMM GRANULOCYTES NFR BLD AUTO: 0.2 % (ref 0–0.5)
IRON SATN MFR SERPL: 23 % (ref 20–50)
IRON SERPL-MCNC: 75 UG/DL (ref 45–160)
LYMPHOCYTES # BLD AUTO: 1.2 K/UL (ref 1–4.8)
LYMPHOCYTES NFR BLD: 20 % (ref 18–48)
MCH RBC QN AUTO: 30.9 PG (ref 27–31)
MCHC RBC AUTO-ENTMCNC: 32.6 G/DL (ref 32–36)
MCV RBC AUTO: 95 FL (ref 82–98)
MONOCYTES # BLD AUTO: 0.5 K/UL (ref 0.3–1)
MONOCYTES NFR BLD: 8.1 % (ref 4–15)
NEUTROPHILS # BLD AUTO: 4 K/UL (ref 1.8–7.7)
NEUTROPHILS NFR BLD: 67.7 % (ref 38–73)
NRBC BLD-RTO: 0 /100 WBC
PLATELET # BLD AUTO: 210 K/UL (ref 150–450)
PMV BLD AUTO: 11.4 FL (ref 9.2–12.9)
POTASSIUM SERPL-SCNC: 4.5 MMOL/L (ref 3.5–5.1)
PROT SERPL-MCNC: 6.7 G/DL (ref 6–8.4)
RBC # BLD AUTO: 4.5 M/UL (ref 4.6–6.2)
SODIUM SERPL-SCNC: 141 MMOL/L (ref 136–145)
TOTAL IRON BINDING CAPACITY: 323 UG/DL (ref 250–450)
TSH SERPL DL<=0.005 MIU/L-ACNC: 1.53 UIU/ML (ref 0.4–4)
WBC # BLD AUTO: 5.94 K/UL (ref 3.9–12.7)

## 2023-03-09 PROCEDURE — 84443 ASSAY THYROID STIM HORMONE: CPT | Performed by: INTERNAL MEDICINE

## 2023-03-09 PROCEDURE — 83540 ASSAY OF IRON: CPT | Performed by: INTERNAL MEDICINE

## 2023-03-09 PROCEDURE — 80053 COMPREHEN METABOLIC PANEL: CPT | Performed by: INTERNAL MEDICINE

## 2023-03-09 PROCEDURE — 85025 COMPLETE CBC W/AUTO DIFF WBC: CPT | Performed by: INTERNAL MEDICINE

## 2023-03-09 PROCEDURE — 84153 ASSAY OF PSA TOTAL: CPT | Performed by: INTERNAL MEDICINE

## 2023-03-09 PROCEDURE — 83550 IRON BINDING TEST: CPT | Performed by: INTERNAL MEDICINE

## 2023-03-09 PROCEDURE — 36415 COLL VENOUS BLD VENIPUNCTURE: CPT | Performed by: INTERNAL MEDICINE

## 2023-03-09 PROCEDURE — 82728 ASSAY OF FERRITIN: CPT | Performed by: INTERNAL MEDICINE

## 2023-03-22 VITALS — HEIGHT: 70 IN | WEIGHT: 255 LBS | BODY MASS INDEX: 36.51 KG/M2

## 2023-03-22 NOTE — PRE-PROCEDURE INSTRUCTIONS
PREOP INTERVIEW COMPLETED VIA PHONE WITH PT,INSTRUCTED PT TO BRING EYE DROPS ON DAY OF PROCEDURE AND TAKE BP MED WITH SMALL SIP OF WATER THE MORNING OF PROCEDURE, ALLOWED TO ASK QUESTIONS. PT VERBALIZES GOOD UNDERSTANDING AND AGREES.

## 2023-03-22 NOTE — DISCHARGE INSTRUCTIONS
BEFORE THE PROCEDURE:    REPORT ANY CHANGE IN YOUR PHYSICAL CONDITION TO YOUR DOCTOR IMMEDIATELY.  SELF ISOLATE AND CHECK TEMPERATURE DAILY, IF TEMP OVER 100, CALL PHYSICIAN IMMEDIATELY.  TRY TO REFRAIN FROM SMOKING AND ALCOHOL 72 HOURS BEFORE YOUR PROCEDURE.   DO NOT EAT OR DRINK ANYTHING AFTER MIDNIGHT THE NIGHT BEFORE YOUR PROCEDURE.  NO MAKE UP, NAIL POLISH OR JEWELRY.      BRING EYE DROPS.    SOMEONE WILL CALL YOU THE DAY BEFORE YOUR PROCEDURE WITH A CHECK-IN TIME FOR YOUR PROCEDURE.    DAY OF YOUR PROCEDURE:    TAKE BLOOD PRESSURE MEDICATIONS THE MORNING OF YOUR PROCEDURE, WITH SMALL SIPS WATER, AS DIRECTED BY YOUR PHYSICIAN.   DO NOT TAKE ANY DIABETIC MEDICATIONS UNLESS DIRECTED TO DO SO BY YOUR PHYSICIAN.   CONTACT LENSES AND DENTURES MUST BE REMOVED.  A RESPONSIBLE ADULT MUST ACCOMPANY YOU HOME UPON DISCHARGE.   ONLY 1 VISITOR ALLOWED PER ROOM.     YOUR THOUGHTS AND OPINIONS HELP US TO BETTER SERVE YOU.     PLEASE PARTICIPATE IN SURVEYS ABOUT YOUR CARE.    THANK YOU FOR CHOOSING [facility].

## 2023-03-23 ENCOUNTER — ANESTHESIA EVENT (OUTPATIENT)
Dept: SURGERY | Facility: HOSPITAL | Age: 88
End: 2023-03-23
Payer: MEDICARE

## 2023-03-23 NOTE — ANESTHESIA PREPROCEDURE EVALUATION
03/23/2023  Geraldo Mccallum is a 88 y.o., male.      Pre-op Assessment    I have reviewed the Patient Summary Reports.    I have reviewed the NPO Status.   I have reviewed the Medications.     Review of Systems  Anesthesia Hx:  No problems with previous Anesthesia  Denies Family Hx of Anesthesia complications.   Denies Personal Hx of Anesthesia complications.   Social:  Non-Smoker    Cardiovascular:   Hypertension, well controlled Dysrhythmias HX PST,HX AV BLOCK   Pulmonary:   Pneumonia Shortness of breath    Renal/:   renal calculi    Hepatic/GI:   PUD,    Neurological:  Neurology Normal    Endocrine:   Hypothyroidism        Physical Exam  General: Well nourished    Airway:  Mallampati: III / II  Mouth Opening: Normal  TM Distance: Normal  Tongue: Normal  Neck ROM: Normal ROM    Dental:  Intact    Chest/Lungs:  Clear to auscultation    Heart:  Rate: Normal  Rhythm: Regular Rhythm  Sounds: Normal        Anesthesia Plan  Type of Anesthesia, risks & benefits discussed:    Anesthesia Type: MAC  Intra-op Monitoring Plan: Standard ASA Monitors  Post Op Pain Control Plan: multimodal analgesia  Induction:  IV  Airway Plan: Direct  Informed Consent: Informed consent signed with the Patient and all parties understand the risks and agree with anesthesia plan.  All questions answered.   ASA Score: 4  Day of Surgery Review of History & Physical: I have interviewed and examined the patient. I have reviewed the patient's H&P dated: There are no significant changes.     Ready For Surgery From Anesthesia Perspective.     .

## 2023-03-24 PROBLEM — H25.012 CORTICAL AGE-RELATED CATARACT, LEFT EYE: Status: ACTIVE | Noted: 2023-03-24

## 2023-03-24 PROBLEM — H25.12 AGE-RELATED NUCLEAR CATARACT, LEFT EYE: Status: ACTIVE | Noted: 2023-03-24

## 2023-03-27 ENCOUNTER — HOSPITAL ENCOUNTER (OUTPATIENT)
Dept: PREADMISSION TESTING | Facility: HOSPITAL | Age: 88
Discharge: HOME | End: 2023-03-27
Payer: MEDICARE

## 2023-03-28 ENCOUNTER — ANESTHESIA (OUTPATIENT)
Dept: SURGERY | Facility: HOSPITAL | Age: 88
End: 2023-03-28
Payer: MEDICARE

## 2023-03-28 ENCOUNTER — HOSPITAL ENCOUNTER (OUTPATIENT)
Facility: HOSPITAL | Age: 88
Discharge: HOME | End: 2023-03-28
Attending: OPHTHALMOLOGY | Admitting: OPHTHALMOLOGY
Payer: MEDICARE

## 2023-03-28 VITALS
DIASTOLIC BLOOD PRESSURE: 76 MMHG | RESPIRATION RATE: 20 BRPM | HEART RATE: 55 BPM | TEMPERATURE: 98 F | OXYGEN SATURATION: 97 % | SYSTOLIC BLOOD PRESSURE: 154 MMHG

## 2023-03-28 DIAGNOSIS — H25.12 AGE-RELATED NUCLEAR CATARACT, LEFT EYE: ICD-10-CM

## 2023-03-28 PROBLEM — H25.012 CORTICAL AGE-RELATED CATARACT, LEFT EYE: Status: RESOLVED | Noted: 2023-03-24 | Resolved: 2023-03-28

## 2023-03-28 PROCEDURE — 71000015 HC POSTOP RECOV 1ST HR: Performed by: OPHTHALMOLOGY

## 2023-03-28 PROCEDURE — 37000009 HC ANESTHESIA EA ADD 15 MINS: Performed by: OPHTHALMOLOGY

## 2023-03-28 PROCEDURE — 63600175 PHARM REV CODE 636 W HCPCS: Performed by: OPHTHALMOLOGY

## 2023-03-28 PROCEDURE — 36000706: Performed by: OPHTHALMOLOGY

## 2023-03-28 PROCEDURE — 25000003 PHARM REV CODE 250: Performed by: OPHTHALMOLOGY

## 2023-03-28 PROCEDURE — 36000707: Performed by: OPHTHALMOLOGY

## 2023-03-28 PROCEDURE — 25000242 PHARM REV CODE 250 ALT 637 W/ HCPCS: Performed by: ANESTHESIOLOGY

## 2023-03-28 PROCEDURE — 37000008 HC ANESTHESIA 1ST 15 MINUTES: Performed by: OPHTHALMOLOGY

## 2023-03-28 PROCEDURE — V2632 POST CHMBR INTRAOCULAR LENS: HCPCS | Performed by: OPHTHALMOLOGY

## 2023-03-28 RX ORDER — PROPARACAINE HYDROCHLORIDE 5 MG/ML
SOLUTION/ DROPS OPHTHALMIC
Status: DISCONTINUED | OUTPATIENT
Start: 2023-03-28 | End: 2023-03-28 | Stop reason: HOSPADM

## 2023-03-28 RX ORDER — PHENYLEPHRINE HYDROCHLORIDE 25 MG/ML
1 SOLUTION/ DROPS OPHTHALMIC
Status: COMPLETED | OUTPATIENT
Start: 2023-03-28 | End: 2023-03-28

## 2023-03-28 RX ORDER — MIDAZOLAM HCL 2 MG/ML
4 SYRUP ORAL ONCE
Status: COMPLETED | OUTPATIENT
Start: 2023-03-28 | End: 2023-03-28

## 2023-03-28 RX ORDER — TROPICAMIDE 10 MG/ML
1 SOLUTION/ DROPS OPHTHALMIC
Status: COMPLETED | OUTPATIENT
Start: 2023-03-28 | End: 2023-03-28

## 2023-03-28 RX ORDER — MOXIFLOXACIN 5 MG/ML
SOLUTION/ DROPS OPHTHALMIC
Status: DISCONTINUED | OUTPATIENT
Start: 2023-03-28 | End: 2023-03-28 | Stop reason: HOSPADM

## 2023-03-28 RX ORDER — PROPARACAINE HYDROCHLORIDE 5 MG/ML
1 SOLUTION/ DROPS OPHTHALMIC
Status: COMPLETED | OUTPATIENT
Start: 2023-03-28 | End: 2023-03-28

## 2023-03-28 RX ORDER — MOXIFLOXACIN 5 MG/ML
1 SOLUTION/ DROPS OPHTHALMIC
Status: COMPLETED | OUTPATIENT
Start: 2023-03-28 | End: 2023-03-28

## 2023-03-28 RX ADMIN — MIDAZOLAM HYDROCHLORIDE 4 MG: 2 SYRUP ORAL at 08:03

## 2023-03-28 RX ADMIN — TROPICAMIDE 1 DROP: 10 SOLUTION/ DROPS OPHTHALMIC at 07:03

## 2023-03-28 RX ADMIN — PROPARACAINE HYDROCHLORIDE 1 DROP: 5 SOLUTION/ DROPS OPHTHALMIC at 07:03

## 2023-03-28 RX ADMIN — MOXIFLOXACIN 1 DROP: 5 SOLUTION/ DROPS OPHTHALMIC at 07:03

## 2023-03-28 RX ADMIN — PHENYLEPHRINE HYDROCHLORIDE 1 DROP: 25 SOLUTION/ DROPS OPHTHALMIC at 07:03

## 2023-03-28 NOTE — DISCHARGE SUMMARY
[facility]  Brief Discharge Note    Patient Name:  Geraldo Mccallum   MRN:  [id number]    :  1935   Admission Date:  3/28/2023   Discharge Date:  3/28/2023   Attending Physician: Maldonado Garrison MD     Procedure:  PHACOEMULSIFICATION, CATARACT (Left)    OUTCOME: Patient tolerated procedure well without complication and is now ready for discharge.    DISPOSITION: Home or Self Care    FINAL DIAGNOSIS:  Age-related nuclear cataract, left eye                                     Age-related cortical cataract, left eye    FOLLOWUP: 1 day in clinic    DISCHARGE INSTRUCTIONS:  Wear eye shield until your schedule appointment with doctor.                                                       Only remove eye shield to apply eye drops.                                                       Follow the post-op eye instructions given from the clinic.

## 2023-03-28 NOTE — OP NOTE
[facility]  Operative Note    Date:  3/28/2023    Patient:  Geraldo Mccallum  MRN:  [id number]   :  1935    Surgeon:  Maldonado Garrison MD    PREOPERATIVE DIAGNOSIS:  Visually significant age-related nuclear and cortical cataract, left eye.    POSTOPERATIVE DIAGNOSIS:  Visually significant age-related nuclear and cortical cataract, left eye.    PROCEDURE:  Phacoemulsification of cataract with posterior chamber intraocular lens implant, left eye.    ANESTHESIA:  Topical with MAC.      COMPLICATIONS:  None.    ESTIMATED BLOOD LOSS:  Minimal.    PROCEDURE IN DETAIL:  The patient presented to our clinic complaining of increasing difficulties with activities of daily living due to a visually significant cataract in the left eye.  After risks, benefits, and alternatives were explained to the patient, the patient agreed to proceed with the above procedure.  The patient was brought to the operating room and received topical anesthetic to the left eye and was then prepped and draped in the usual sterile fashion.  The left eye was first entered at 5:00 o'clock paracentesis site followed by intracameral lidocaine, and Viscoat.  The primary surgical site was then created at 2:30 o'clock followed by continuous capsulotomy, hydrodissection, and phacoemulsification of the cataract.  Residual cortical material was removed using the automated irrigation-aspiration technique.  Provisc was injected into the posterior chamber and an Kumar SY60WF 15.0 diopter lens was placed in the bag without difficulty.  Residual Provisc was removed using the automated irrigation-aspiration technique.  The eye was re-pressurized using BSS solution, and both the paracentesis and primary surgical site were demonstrated to be watertight at the end of the case with Weck-Peyton manipulation.  Vigamox, Pred Forte, and Prolensa were placed in the eye followed by placement of a Patiño shield.  The patient tolerated the procedure well and was  taken to the recovery room in good and stable condition.  The patient was instructed to refrain from any heavy lifting, bending, stooping, or straining activities and to follow-up in the morning for routine post-operative care with Dr. Maldonado Garrison.

## 2023-03-28 NOTE — ANESTHESIA POSTPROCEDURE EVALUATION
Anesthesia Post Evaluation    Patient: Geraldo Mccallum    Procedure(s) Performed: Procedure(s) (LRB):  PHACOEMULSIFICATION, CATARACT, WITH IOL INSERTION (Left)    Final Anesthesia Type: MAC      Patient location during evaluation: OPS  Patient participation: Yes- Able to Participate  Level of consciousness: awake  Post-procedure vital signs: reviewed and stable  Pain management: adequate  Airway patency: patent    PONV status at discharge: No PONV  Anesthetic complications: no      Cardiovascular status: blood pressure returned to baseline  Respiratory status: spontaneous ventilation  Hydration status: euvolemic  Follow-up not needed.          Vitals Value Taken Time   /76 03/28/23 0926   Temp 36.5 °C (97.7 °F) 03/28/23 0926   Pulse 55 03/28/23 0926   Resp 20 03/28/23 0926   SpO2 97 % 03/28/23 0926         No case tracking events are documented in the log.      Pain/Salena Score: Salena Score: 10 (3/28/2023  9:26 AM)

## 2023-03-28 NOTE — DISCHARGE INSTRUCTIONS
-RELAX AT HOME FOR THE REST OF THE DAY. YOU MAY EAT ANYTHING YOU LIKE.   -PLAN TO SEE DR GONZALES TOMORROW AT THE OFFICE. BRING ALL EYE DROPS WITH YOU TO THIS APPOINTMENT.    -PUT EYE DROPS IN THE AFFECTED EYE AS PER DR GONZALES'S EYE DROP SCHEDULED. -USE IN ANY ORDER, WAIT 5 MINUTES BETWEEN DROPS.  -REMOVE EYE SHIELD WHILE PUTTING EYE DROPS IN.    -DO NOT RUB YOUR EYE!!  -DO NOT EXERT YOURSELF - NO HEAVY LIFTING, RUNNING OR SWIMMING FOR 1 WEEK.  -YOU MAY SHOWER, BUT DON'T ALLOW WATER INTO YOUR EYE FOR 1 WEEK.  -WEAR PROTECTIVE SUNGLASSES DURING THE DAY AND AN EYE SHIELD AT NIGHT FOR 1 WEEK.    NO DRINKING ALCOHOL OR DRIVING FOR 24 HOURS.    -IF YOU HAVE PAIN, REDNESS OR DECREASED VISION, CALL DR GONZALES'S OFFICE -[phone] OR IF AFTER HOURS CALL [phone].

## 2023-03-28 NOTE — PLAN OF CARE
Returned to room; eye shield to left eye intact.  Muffin and juice provided.  No complaints voiced.

## 2023-03-28 NOTE — PLAN OF CARE
1 DROP OF LOTEPREDNOL ETABONATE OPHTHALMIC GEL ADMINISTERED IN LEFT EYE @ 0914 BY BENTLEY GRACIA RN

## 2023-03-28 NOTE — TRANSFER OF CARE
Anesthesia Transfer of Care Note    Patient: Geraldo Mccallum    Procedure(s) Performed: Procedure(s) (LRB):  PHACOEMULSIFICATION, CATARACT, WITH IOL INSERTION (Left)    Patient location: Other: OR C    Anesthesia Type: MAC    Transport from OR: Transported from OR on room air with adequate spontaneous ventilation    Post pain: adequate analgesia    Post assessment: no apparent anesthetic complications    Post vital signs: stable    Level of consciousness: awake    Nausea/Vomiting: no nausea/vomiting    Complications: none    Transfer of care protocol was followed      Last vitals:   HR 47  RR 16  SPO2 100  /65

## 2023-04-18 ENCOUNTER — LAB VISIT (OUTPATIENT)
Dept: LAB | Facility: HOSPITAL | Age: 88
End: 2023-04-18
Attending: INTERNAL MEDICINE
Payer: MEDICARE

## 2023-04-18 DIAGNOSIS — R79.9 ABNORMAL BLOOD FINDINGS: ICD-10-CM

## 2023-04-18 LAB
ALBUMIN/CREAT UR: 16.3 UG/MG (ref 0–30)
BILIRUB UR QL STRIP: NEGATIVE
CLARITY UR: CLEAR
COLOR UR: YELLOW
CREAT UR-MCNC: 52.9 MG/DL (ref 23–375)
GLUCOSE UR QL STRIP: NEGATIVE
HGB UR QL STRIP: NEGATIVE
KETONES UR QL STRIP: NEGATIVE
LEUKOCYTE ESTERASE UR QL STRIP: NEGATIVE
MICROALBUMIN UR DL<=1MG/L-MCNC: 8.6 MG/L
NITRITE UR QL STRIP: NEGATIVE
PH UR STRIP: 6 [PH] (ref 5–8)
PROT UR QL STRIP: NEGATIVE
SP GR UR STRIP: 1.01 (ref 1–1.03)
URN SPEC COLLECT METH UR: NORMAL
UROBILINOGEN UR STRIP-ACNC: 1 EU/DL

## 2023-04-18 PROCEDURE — 81003 URINALYSIS AUTO W/O SCOPE: CPT | Performed by: INTERNAL MEDICINE

## 2023-04-18 PROCEDURE — 82570 ASSAY OF URINE CREATININE: CPT | Performed by: INTERNAL MEDICINE

## 2023-04-25 ENCOUNTER — TELEPHONE (OUTPATIENT)
Dept: HEPATOLOGY | Facility: HOSPITAL | Age: 88
End: 2023-04-25
Payer: MEDICARE

## 2023-04-25 NOTE — TELEPHONE ENCOUNTER
US looked ok X for one gallstone that was present is GB, doubt this is causing any trouble and can discuss at next visit.  Non urgent.

## 2023-05-22 PROBLEM — N17.9 AKI (ACUTE KIDNEY INJURY): Status: RESOLVED | Noted: 2021-12-30 | Resolved: 2023-05-22

## 2023-05-22 PROBLEM — K80.20 CALCULUS OF GALLBLADDER WITHOUT CHOLECYSTITIS WITHOUT OBSTRUCTION: Status: ACTIVE | Noted: 2023-05-22

## 2023-05-22 PROBLEM — M19.042 ARTHRITIS OF FINGER OF LEFT HAND: Status: ACTIVE | Noted: 2023-05-22

## 2023-05-22 PROBLEM — E03.9 HYPOTHYROIDISM: Status: ACTIVE | Noted: 2023-05-22

## 2023-06-02 ENCOUNTER — LAB VISIT (OUTPATIENT)
Dept: LAB | Facility: HOSPITAL | Age: 88
End: 2023-06-02
Attending: SURGERY
Payer: MEDICARE

## 2023-06-02 DIAGNOSIS — R79.89 ELEVATED LIVER FUNCTION TESTS: Primary | ICD-10-CM

## 2023-06-02 LAB
ALBUMIN SERPL BCP-MCNC: 3.3 G/DL (ref 3.5–5.2)
ALP SERPL-CCNC: 131 U/L (ref 55–135)
ALT SERPL W/O P-5'-P-CCNC: 20 U/L (ref 10–44)
ANION GAP SERPL CALC-SCNC: 7 MMOL/L (ref 8–16)
AST SERPL-CCNC: 15 U/L (ref 10–40)
BILIRUB SERPL-MCNC: 0.4 MG/DL (ref 0.1–1)
BUN SERPL-MCNC: 25 MG/DL (ref 8–23)
CALCIUM SERPL-MCNC: 8.9 MG/DL (ref 8.7–10.5)
CHLORIDE SERPL-SCNC: 107 MMOL/L (ref 95–110)
CO2 SERPL-SCNC: 26 MMOL/L (ref 23–29)
CREAT SERPL-MCNC: 1.7 MG/DL (ref 0.5–1.4)
EST. GFR  (NO RACE VARIABLE): 38.5 ML/MIN/1.73 M^2
GLUCOSE SERPL-MCNC: 153 MG/DL (ref 70–110)
POTASSIUM SERPL-SCNC: 4.2 MMOL/L (ref 3.5–5.1)
PROT SERPL-MCNC: 6.9 G/DL (ref 6–8.4)
SODIUM SERPL-SCNC: 140 MMOL/L (ref 136–145)

## 2023-06-02 PROCEDURE — 80053 COMPREHEN METABOLIC PANEL: CPT | Performed by: SURGERY

## 2023-06-02 PROCEDURE — 36415 COLL VENOUS BLD VENIPUNCTURE: CPT | Performed by: SURGERY

## 2023-09-28 PROBLEM — K25.0 ACUTE GASTRIC ULCER WITH HEMORRHAGE: Status: RESOLVED | Noted: 2021-12-30 | Resolved: 2023-09-28

## 2023-09-28 PROBLEM — K29.00 ACUTE SUPERFICIAL GASTRITIS WITHOUT HEMORRHAGE: Status: RESOLVED | Noted: 2022-02-24 | Resolved: 2023-09-28

## 2023-09-28 PROBLEM — D62 ACUTE BLOOD LOSS ANEMIA: Status: RESOLVED | Noted: 2021-12-30 | Resolved: 2023-09-28

## 2023-11-03 ENCOUNTER — HOSPITAL ENCOUNTER (OUTPATIENT)
Dept: RADIOLOGY | Facility: HOSPITAL | Age: 88
Discharge: HOME | End: 2023-11-03
Attending: INTERNAL MEDICINE
Payer: MEDICARE

## 2023-11-03 DIAGNOSIS — M79.675 CHRONIC TOE PAIN, LEFT FOOT: ICD-10-CM

## 2023-11-03 DIAGNOSIS — G89.29 CHRONIC TOE PAIN, LEFT FOOT: ICD-10-CM

## 2023-11-03 PROCEDURE — 73660 X-RAY EXAM OF TOE(S): CPT | Mod: TC,LT

## 2023-11-16 ENCOUNTER — LAB VISIT (OUTPATIENT)
Dept: LAB | Facility: HOSPITAL | Age: 88
End: 2023-11-16
Payer: MEDICARE

## 2023-11-16 DIAGNOSIS — E87.5 ACUTE HYPERKALEMIA: Primary | ICD-10-CM

## 2023-11-16 LAB — POTASSIUM SERPL-SCNC: 4.3 MMOL/L (ref 3.5–5.1)

## 2023-11-16 PROCEDURE — 84132 ASSAY OF SERUM POTASSIUM: CPT

## 2023-11-16 PROCEDURE — 36415 COLL VENOUS BLD VENIPUNCTURE: CPT

## 2024-01-06 ENCOUNTER — HOSPITAL ENCOUNTER (OUTPATIENT)
Dept: RADIOLOGY | Facility: HOSPITAL | Age: 89
Discharge: HOME | End: 2024-01-06
Attending: NURSE PRACTITIONER
Payer: MEDICARE

## 2024-01-06 DIAGNOSIS — R05.9 COUGHING: ICD-10-CM

## 2024-01-06 PROCEDURE — 71046 X-RAY EXAM CHEST 2 VIEWS: CPT | Mod: TC

## 2024-01-31 PROBLEM — K92.2 GIB (GASTROINTESTINAL BLEEDING): Status: RESOLVED | Noted: 2021-12-30 | Resolved: 2024-01-31

## 2024-01-31 PROBLEM — E11.9 TYPE 2 DIABETES MELLITUS WITHOUT COMPLICATION, WITHOUT LONG-TERM CURRENT USE OF INSULIN: Status: ACTIVE | Noted: 2024-01-31

## 2024-01-31 PROBLEM — K80.20 CALCULUS OF GALLBLADDER WITHOUT CHOLECYSTITIS WITHOUT OBSTRUCTION: Status: RESOLVED | Noted: 2023-05-22 | Resolved: 2024-01-31

## 2024-04-16 ENCOUNTER — HOSPITAL ENCOUNTER (EMERGENCY)
Facility: HOSPITAL | Age: 89
Discharge: HOME | End: 2024-04-16
Attending: EMERGENCY MEDICINE
Payer: MEDICARE

## 2024-04-16 VITALS
TEMPERATURE: 99 F | DIASTOLIC BLOOD PRESSURE: 67 MMHG | RESPIRATION RATE: 18 BRPM | WEIGHT: 243 LBS | HEART RATE: 70 BPM | HEIGHT: 70 IN | SYSTOLIC BLOOD PRESSURE: 157 MMHG | OXYGEN SATURATION: 98 % | BODY MASS INDEX: 34.79 KG/M2
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
## 2024-04-16 DIAGNOSIS — S51.012A SKIN TEAR OF LEFT ELBOW WITHOUT COMPLICATION, INITIAL ENCOUNTER: ICD-10-CM

## 2024-04-16 DIAGNOSIS — S61.217A LACERATION OF LEFT LITTLE FINGER WITHOUT FOREIGN BODY WITHOUT DAMAGE TO NAIL, INITIAL ENCOUNTER: Primary | ICD-10-CM

## 2024-04-16 PROCEDURE — 25000003 PHARM REV CODE 250: Performed by: CLINICAL NURSE SPECIALIST

## 2024-04-16 PROCEDURE — 12001 RPR S/N/AX/GEN/TRNK 2.5CM/<: CPT

## 2024-04-16 PROCEDURE — 99284 EMERGENCY DEPT VISIT MOD MDM: CPT | Mod: 25

## 2024-04-16 RX ORDER — LIDOCAINE HYDROCHLORIDE 10 MG/ML
5 INJECTION, SOLUTION EPIDURAL; INFILTRATION; INTRACAUDAL; PERINEURAL ONCE
Status: COMPLETED | OUTPATIENT
Start: 2024-04-16 | End: 2024-04-16

## 2024-04-16 RX ADMIN — LIDOCAINE HYDROCHLORIDE 50 MG: 10 INJECTION, SOLUTION EPIDURAL; INFILTRATION; INTRACAUDAL; PERINEURAL at 01:04

## 2024-04-16 NOTE — ED PROVIDER NOTES
Encounter Date: 4/16/2024       History     Chief Complaint   Patient presents with    Fall     Pt stated that he tripped attempting to take a step up, falling to the ground, striking head / left arm / hand on concrete. Denied LOC.      89-year-old male presents emergency room for evaluation after stepping up on a curb and falling injuring his left arm, face and laceration noted to left pinky finger at the base.  Left arm has bruising, swelling, skin tear noted.  Patient is on a blood thinner.  Patient has an abrasion to left side of his forehead.        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    Atrioventricular block     Bleeding ulcer     Blood pressure elevated without history of HTN     BPH (benign prostatic hyperplasia)     Bradycardia     35-60's    Calculus of gallbladder without cholecystitis without obstruction 05/22/2023    Candidiasis of skin and nails     CAP (community acquired pneumonia)     Carbuncle of back (any part, except buttock)     Cellulitis and abscess of trunk     Cellulitis of ear canal     Cervical pain (neck)     Colon polyp     Diverticulitis     Encounter for blood transfusion 2021    X3    GIB (gastrointestinal bleeding) 12/30/2021    Hematochezia     History of shortness of breath     Hypertension     IFG (impaired fasting glucose)     Iron deficiency anemia     Kidney stone     Lightheaded     Lung mass 12/09/2021    Malaise and fatigue     Obesity, unspecified     Otalgia     Palpitations     Paroxysmal supraventricular  tachycardia     Pleural effusion     PND (post-nasal drip)     Skin cancer 02/2022    RIGHT FACE    Thyroid disease     Type 2 diabetes mellitus without complication, without long-term current use of insulin 1/31/2024    Unspecified cataract 02/2023    Unspecified hemorrhoids      Past Surgical History:   Procedure Laterality Date    ANGIOGRAM, CORONARY, WITH LEFT HEART CATHETERIZATION  09/01/2022    CIS    COLONOSCOPY      ESOPHAGOGASTRODUODENOSCOPY N/A 02/24/2022    Procedure: EGD (ESOPHAGOGASTRODUODENOSCOPY);  Surgeon: Duane Cardenas MD;  Location: [facility];  Service: General;  Laterality: N/A;  #1 0600    PHACOEMULSIFICATION, CATARACT, WITH IOL INSERTION Right 2/28/2023    Procedure: PHACOEMULSIFICATION, CATARACT, WITH IOL INSERTION;  Surgeon: Maldonado Garrison MD;  Location: [facility];  Service: Ophthalmology;  Laterality: Right;  5th - 0800  PHACO/IOL    PHACOEMULSIFICATION, CATARACT, WITH IOL INSERTION Left 3/28/2023    Procedure: PHACOEMULSIFICATION, CATARACT, WITH IOL INSERTION;  Surgeon: Maldonado Garrison MD;  Location: [facility];  Service: Ophthalmology;  Laterality: Left;  PHACO/IOL  3rd 0700    SKIN CANCER EXCISION Right 2022    RIGHT FACE    TOTAL KNEE ARTHROPLASTY Bilateral 2001     Family History   Problem Relation Name Age of Onset    Cancer Mother  83    Cancer Father      Heart attack Father  69    Heart disease Sister  90    Heart disease Sister  88     Social History     Tobacco Use    Smoking status: Never     Passive exposure: Never    Smokeless tobacco: Never   Substance Use Topics    Alcohol use: Yes     Comment: OCC    Drug use: Never     Review of Systems   Constitutional:  Negative for fever.   HENT:  Negative for sore throat.    Respiratory:  Negative for shortness of breath.    Cardiovascular:  Negative for chest pain.   Gastrointestinal:  Negative for nausea.   Genitourinary:  Negative for dysuria.   Musculoskeletal:  Positive for arthralgias, joint swelling and myalgias. Negative for  back pain.   Skin:  Positive for color change and wound. Negative for rash.   Neurological:  Negative for weakness.   Hematological:  Does not bruise/bleed easily.   All other systems reviewed and are negative.      Physical Exam     Initial Vitals [04/16/24 1315]   BP Pulse Resp Temp SpO2   (!) 157/67 70 18 98.6 °F (37 °C) 98 %      MAP       --         Physical Exam    Nursing note and vitals reviewed.  Constitutional: He appears well-developed and well-nourished.   HENT:   Head: Normocephalic and atraumatic.   Eyes: Pupils are equal, round, and reactive to light.   Neck:   Normal range of motion.  Cardiovascular:  Normal rate and regular rhythm.           Pulmonary/Chest: Breath sounds normal.   Abdominal: Abdomen is soft. Bowel sounds are normal.   Musculoskeletal:         General: Normal range of motion.      Cervical back: Normal range of motion.     Neurological: He is alert and oriented to person, place, and time.   Skin:   Large skin tear to left elbow.  Laceration to left pinky finger at the base.  Patient has multiple abrasions noted to left side of his face   Psychiatric: He has a normal mood and affect.         ED Course   Lac Repair    Date/Time: 4/16/2024 2:07 PM    Performed by: Tierra Mcgowan NP  Authorized by: Phil Roberts MD    Anesthesia:     Anesthesia method:  Local infiltration    Local anesthetic:  Lidocaine 1% w/o epi  Laceration details:     Location:  Finger    Finger location:  L small finger    Length (cm):  1    Depth (mm):  0.3  Exploration:     Imaging outcome: foreign body not noted      Wound exploration: wound explored through full range of motion and entire depth of wound visualized    Treatment:     Area cleansed with:  Povidone-iodine    Amount of cleaning:  Standard    Irrigation solution:  Sterile saline  Skin repair:     Repair method:  Sutures    Suture size:  3-0    Suture technique:  Simple interrupted    Number of sutures:  3  Approximation:      Approximation:  Close  Repair type:     Repair type:  Simple  Post-procedure details:     Dressing:  Non-adherent dressing    Procedure completion:  Tolerated    Labs Reviewed - No data to display       Imaging Results              CT Head Without Contrast (Final result)  Result time 04/16/24 13:52:43      Final result by Jaxson Rivera MD (04/16/24 13:52:43)                   Impression:      No acute intracranial findings.      Electronically signed by: Jaxson Rivera MD  Date:    04/16/2024  Time:    13:52               Narrative:    EXAMINATION:  CT HEAD WITHOUT CONTRAST    CLINICAL HISTORY:  fall;    TECHNIQUE:  Iterative reconstruction technique was performed.    CT/cardiac nuclear exam/s in prior 12 months:  0.    COMPARISON:  .    FINDINGS:  No hydrocephalus. No mass or mass effect. No signs of acute hemorrhage. Cranial bones unremarkable.  Mild left frontal scalp swelling.  Mild paranasal sinus disease.                                       X-Ray Elbow Complete Left (Final result)  Result time 04/16/24 14:00:24      Final result by Jaxson Rivera MD (04/16/24 14:00:24)                   Impression:      Dorsal soft tissue injury.  No acute osseous finding.      Electronically signed by: Jaxson Rivera MD  Date:    04/16/2024  Time:    14:00               Narrative:    EXAMINATION:  XR ELBOW COMPLETE 3 VIEW LEFT    CLINICAL HISTORY:  Unspecified fall, initial encounter    FINDINGS:  No fracture or dislocation.   Dorsal soft tissue injury.                                       Medications   LIDOcaine (PF) 10 mg/ml (1%) injection 50 mg (50 mg Infiltration Given 4/16/24 1337)     Medical Decision Making  Amount and/or Complexity of Data Reviewed  Radiology: ordered.    Risk  Prescription drug management.                                      Clinical Impression:  Final diagnoses:  [W19.XXXA] Fall  [S61.217A] Laceration of left little finger without foreign body without damage to nail, initial encounter  (Primary)  [S51.012A] Skin tear of left elbow without complication, initial encounter          ED Disposition Condition    Discharge Stable          ED Prescriptions    None       Follow-up Information       Follow up With Specialties Details Why Contact Info    Stefan Fleming III, MD Internal Medicine  As needed [address] [address]  [phone]               Tierra Mcgowan, KAYLEE  04/16/24 140       Tierra Mcgowan NP  04/16/24 3809

## 2024-04-17 ENCOUNTER — HOSPITAL ENCOUNTER (EMERGENCY)
Facility: HOSPITAL | Age: 89
Discharge: HOME | End: 2024-04-17
Attending: EMERGENCY MEDICINE
Payer: MEDICARE

## 2024-04-17 VITALS
OXYGEN SATURATION: 96 % | TEMPERATURE: 98 F | WEIGHT: 243 LBS | BODY MASS INDEX: 34.79 KG/M2 | HEIGHT: 70 IN | SYSTOLIC BLOOD PRESSURE: 124 MMHG | DIASTOLIC BLOOD PRESSURE: 59 MMHG | HEART RATE: 67 BPM | RESPIRATION RATE: 18 BRPM

## 2024-04-17 DIAGNOSIS — Z48.00 DRESSING CHANGE: Primary | ICD-10-CM

## 2024-04-17 PROCEDURE — 99282 EMERGENCY DEPT VISIT SF MDM: CPT

## 2024-04-17 NOTE — ED PROVIDER NOTES
Encounter Date: 4/17/2024       History     Chief Complaint   Patient presents with    Dressing Change     Patient reports to the ED for a dressing change to the left elbow from a fall yesterday.      89-year-old male presents to the emergency room for dressing change to his left elbow from a fall yesterday.  Patient was seen in this emergency room in which he had a large skin tear to his left elbow.        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    Atrioventricular block     Bleeding ulcer     Blood pressure elevated without history of HTN     BPH (benign prostatic hyperplasia)     Bradycardia     35-60's    Calculus of gallbladder without cholecystitis without obstruction 05/22/2023    Candidiasis of skin and nails     CAP (community acquired pneumonia)     Carbuncle of back (any part, except buttock)     Cellulitis and abscess of trunk     Cellulitis of ear canal     Cervical pain (neck)     Colon polyp     Diverticulitis     Encounter for blood transfusion 2021    X3    GIB (gastrointestinal bleeding) 12/30/2021    Hematochezia     History of shortness of breath     Hypertension     IFG (impaired fasting glucose)     Iron deficiency anemia     Kidney stone     Lightheaded     Lung mass 12/09/2021    Malaise and fatigue     Obesity, unspecified     Otalgia     Palpitations     Paroxysmal supraventricular tachycardia     Pleural effusion     PND (post-nasal drip)     Skin cancer 02/2022    RIGHT FACE    Thyroid disease     Type 2 diabetes mellitus without  complication, without long-term current use of insulin 1/31/2024    Unspecified cataract 02/2023    Unspecified hemorrhoids      Past Surgical History:   Procedure Laterality Date    ANGIOGRAM, CORONARY, WITH LEFT HEART CATHETERIZATION  09/01/2022    CIS    COLONOSCOPY      ESOPHAGOGASTRODUODENOSCOPY N/A 02/24/2022    Procedure: EGD (ESOPHAGOGASTRODUODENOSCOPY);  Surgeon: Duane Cardenas MD;  Location: [facility];  Service: General;  Laterality: N/A;  #1 0600    PHACOEMULSIFICATION, CATARACT, WITH IOL INSERTION Right 2/28/2023    Procedure: PHACOEMULSIFICATION, CATARACT, WITH IOL INSERTION;  Surgeon: Maldonado Garrison MD;  Location: [facility] OR;  Service: Ophthalmology;  Laterality: Right;  5th - 0800  PHACO/IOL    PHACOEMULSIFICATION, CATARACT, WITH IOL INSERTION Left 3/28/2023    Procedure: PHACOEMULSIFICATION, CATARACT, WITH IOL INSERTION;  Surgeon: Maldonado Garrison MD;  Location: [facility];  Service: Ophthalmology;  Laterality: Left;  PHACO/IOL  3rd 0700    SKIN CANCER EXCISION Right 2022    RIGHT FACE    TOTAL KNEE ARTHROPLASTY Bilateral 2001     Family History   Problem Relation Name Age of Onset    Cancer Mother  83    Cancer Father      Heart attack Father  69    Heart disease Sister  90    Heart disease Sister  88     Social History     Tobacco Use    Smoking status: Never     Passive exposure: Never    Smokeless tobacco: Never   Substance Use Topics    Alcohol use: Yes     Comment: OCC    Drug use: Never     Review of Systems   Constitutional:  Negative for fever.   HENT:  Negative for sore throat.    Respiratory:  Negative for shortness of breath.    Cardiovascular:  Negative for chest pain.   Gastrointestinal:  Negative for nausea.   Genitourinary:  Negative for dysuria.   Musculoskeletal:  Negative for back pain.   Skin:  Positive for wound. Negative for rash.   Neurological:  Negative for weakness.   Hematological:  Does not bruise/bleed easily.   All other systems reviewed and are negative.      Physical  Exam     Initial Vitals   BP Pulse Resp Temp SpO2   04/17/24 1349 04/17/24 1349 04/17/24 1349 04/17/24 1352 04/17/24 1349   (!) 124/59 67 18 97.7 °F (36.5 °C) 96 %      MAP       --                Physical Exam    Nursing note and vitals reviewed.  Constitutional: He appears well-developed and well-nourished.   HENT:   Head: Normocephalic and atraumatic.   Eyes: Pupils are equal, round, and reactive to light.   Neck:   Normal range of motion.  Musculoskeletal:         General: Normal range of motion.      Cervical back: Normal range of motion.     Neurological: He is alert and oriented to person, place, and time.   Skin:   Tegaderm noted to left elbow with some seepage   Psychiatric: He has a normal mood and affect.         ED Course   Procedures  Labs Reviewed - No data to display       Imaging Results    None          Medications - No data to display  Medical Decision Making                                    Clinical Impression:  Final diagnoses:  [Z48.00] Dressing change (Primary)          ED Disposition Condition    Discharge Stable          ED Prescriptions    None       Follow-up Information       Follow up With Specialties Details Why Contact Info    Stefan Fleming III, MD Internal Medicine  As needed [address] [facility] [address]  [phone]               Tierra Mcgowan NP  04/17/24 6151

## 2024-04-30 ENCOUNTER — CLINICAL SUPPORT (OUTPATIENT)
Dept: WOUND CARE | Facility: HOSPITAL | Age: 89
End: 2024-04-30
Attending: STUDENT IN AN ORGANIZED HEALTH CARE EDUCATION/TRAINING PROGRAM
Payer: MEDICARE

## 2024-04-30 VITALS
DIASTOLIC BLOOD PRESSURE: 72 MMHG | TEMPERATURE: 98 F | RESPIRATION RATE: 18 BRPM | HEART RATE: 68 BPM | SYSTOLIC BLOOD PRESSURE: 115 MMHG

## 2024-04-30 DIAGNOSIS — L98.492 NON-PRESSURE CHRONIC ULCER OF SKIN OF OTHER SITES WITH FAT LAYER EXPOSED: Primary | ICD-10-CM

## 2024-04-30 PROCEDURE — 99499 UNLISTED E&M SERVICE: CPT | Mod: ,,, | Performed by: STUDENT IN AN ORGANIZED HEALTH CARE EDUCATION/TRAINING PROGRAM

## 2024-04-30 PROCEDURE — 11042 DBRDMT SUBQ TIS 1ST 20SQCM/<: CPT | Mod: ,,, | Performed by: STUDENT IN AN ORGANIZED HEALTH CARE EDUCATION/TRAINING PROGRAM

## 2024-04-30 PROCEDURE — 99214 OFFICE O/P EST MOD 30 MIN: CPT | Mod: 25

## 2024-04-30 PROCEDURE — 11042 DBRDMT SUBQ TIS 1ST 20SQCM/<: CPT

## 2024-05-01 NOTE — H&P
[facility]  Wound Care  History and Physical        History:  89yo male who presents with chronic wound to the L lateral elbow after a fall.  Currently treating with bacitracin ointment and non-stick gauze.  Believes wound is improving, but slowly.     Past Medical History:   Diagnosis Date    Acute blood loss anemia 12/30/2021    Acute cough     Acute gastric ulcer with hemorrhage 12/30/2021    Acute prostatitis     Acute superficial gastritis without hemorrhage 02/24/2022    ETHAN (acute kidney injury) 12/30/2021    Arthritis of finger of left hand 05/22/2023    Atrioventricular block     Bleeding ulcer     Blood pressure elevated without history of HTN     BPH (benign prostatic hyperplasia)     Bradycardia     35-60's    Calculus of gallbladder without cholecystitis without obstruction 05/22/2023    Candidiasis of skin and nails     CAP (community acquired pneumonia)     Carbuncle of back (any part, except buttock)     Cellulitis and abscess of trunk     Cellulitis of ear canal     Cervical pain (neck)     Colon polyp     Diverticulitis     Encounter for blood transfusion 2021    X3    GIB (gastrointestinal bleeding) 12/30/2021    Hematochezia     History of shortness of breath     Hypertension     IFG (impaired fasting glucose)     Iron deficiency anemia     Kidney stone     Lightheaded     Lung mass 12/09/2021    Malaise and fatigue     Obesity, unspecified     Otalgia     Palpitations     Paroxysmal supraventricular tachycardia     Pleural effusion     PND (post-nasal drip)     Skin cancer 02/2022    RIGHT FACE    Thyroid disease     Type 2 diabetes mellitus without complication, without long-term current use of insulin 1/31/2024    Unspecified cataract 02/2023    Unspecified hemorrhoids        Past Surgical History:   Procedure Laterality Date    ANGIOGRAM, CORONARY, WITH LEFT HEART CATHETERIZATION  09/01/2022    CIS    COLONOSCOPY      ESOPHAGOGASTRODUODENOSCOPY N/A 02/24/2022    Procedure:  EGD (ESOPHAGOGASTRODUODENOSCOPY);  Surgeon: Duane Cardenas MD;  Location: [facility] ENDO;  Service: General;  Laterality: N/A;  #1 0600    PHACOEMULSIFICATION, CATARACT, WITH IOL INSERTION Right 2/28/2023    Procedure: PHACOEMULSIFICATION, CATARACT, WITH IOL INSERTION;  Surgeon: Maldonado Garrison MD;  Location: [facility] OR;  Service: Ophthalmology;  Laterality: Right;  5th - 0800  PHACO/IOL    PHACOEMULSIFICATION, CATARACT, WITH IOL INSERTION Left 3/28/2023    Procedure: PHACOEMULSIFICATION, CATARACT, WITH IOL INSERTION;  Surgeon: Maldonado Garrison MD;  Location: [facility] OR;  Service: Ophthalmology;  Laterality: Left;  PHACO/IOL  3rd 0700    SKIN CANCER EXCISION Right 2022    RIGHT FACE    TOTAL KNEE ARTHROPLASTY Bilateral 2001       Family History   Problem Relation Name Age of Onset    Cancer Mother  83    Cancer Father      Heart attack Father  69    Heart disease Sister  90    Heart disease Sister  88        reports that he has never smoked. He has never been exposed to tobacco smoke. He has never used smokeless tobacco. He reports current alcohol use. He reports that he does not use drugs.    has a current medication list which includes the following prescription(s): albuterol, ascorbic acid (vitamin c), aspirin, carvedilol, cephalexin, colchicine, docusate sodium, furosemide, imiquimod, levothyroxine, moxifloxacin, pantoprazole, polyethylene glycol, rosuvastatin, timolol maleate 0.5%, tirzepatide, travoprost, triamcinolone acetonide 0.1%, vit a/vit c/vit e/zinc/copper, and vitamin d, and the following Facility-Administered Medications: phenylephrine-ketorolac and phenylephrine-ketorolac.    Allergies:  Iodinated contrast media    Review of Systems:  Review of Systems   Constitutional:  Negative for chills, fever, malaise/fatigue and weight loss.   Respiratory:  Negative for cough.    Cardiovascular:  Negative for chest pain.   Gastrointestinal:  Negative for abdominal pain, blood in stool, constipation, diarrhea,  "heartburn, melena, nausea and vomiting.   All other systems reviewed and are negative.        Vitals:    04/30/24 0927   BP: 115/72   Pulse: 68   Resp: 18   Temp: 98.4 °F (36.9 °C)         BMI:  There is no height or weight on file to calculate BMI.    Physical Exam:  Physical Exam  Constitutional:       General: He is not in acute distress.     Appearance: He is not ill-appearing or toxic-appearing.   Cardiovascular:      Rate and Rhythm: Normal rate and regular rhythm.   Pulmonary:      Effort: Pulmonary effort is normal. No respiratory distress.   Abdominal:      General: There is no distension.      Palpations: Abdomen is soft.      Tenderness: There is no abdominal tenderness. There is no guarding or rebound.   Musculoskeletal:      Comments: 4.7 x 4.8 x 0.1 cm wound to lateral left elbow with moderate exudate.  No SOI   Skin:     General: Skin is warm and dry.      Capillary Refill: Capillary refill takes less than 2 seconds.   Neurological:      Mental Status: He is alert.         A1C:  No results for input(s): "HGBA1C" in the last 2160 hours.  BMP:  No results for input(s): "GLU", "NA", "K", "CL", "CO2", "BUN", "CREATININE", "CALCIUM", "MG" in the last 2160 hours.   CBC:  No results for input(s): "WBC", "RBC", "HGB", "HCT", "PLT", "MCV", "MCH", "MCHC" in the last 2160 hours.  CMP:  No results for input(s): "GLU", "CALCIUM", "ALBUMIN", "PROT", "NA", "K", "CO2", "CL", "BUN", "ALKPHOS", "ALT", "AST", "BILITOT" in the last 2160 hours.    Invalid input(s): "CREATININ"  PREALBUMIN:  No results for input(s): "PREALBUMIN" in the last 2160 hours.  WOUND CULTURES:  No results for input(s): "LABAERO" in the last 2160 hours.        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    RTC 2 weeks       Sharifa Castille [facility]

## 2024-05-05 PROBLEM — K27.9 PUD (PEPTIC ULCER DISEASE): Chronic | Status: ACTIVE | Noted: 2024-05-05

## 2024-05-06 PROBLEM — I45.5 SINUS PAUSE: Status: ACTIVE | Noted: 2024-05-06

## 2024-05-06 PROBLEM — R00.1 SYMPTOMATIC BRADYCARDIA: Status: ACTIVE | Noted: 2024-05-06

## 2024-05-06 PROBLEM — I49.9 ARRHYTHMIA: Status: ACTIVE | Noted: 2024-05-06

## 2024-06-20 PROBLEM — Z95.0 PACEMAKER: Status: ACTIVE | Noted: 2024-06-20

## 2024-06-27 ENCOUNTER — LAB VISIT (OUTPATIENT)
Dept: LAB | Facility: HOSPITAL | Age: 89
End: 2024-06-27
Attending: INTERNAL MEDICINE
Payer: MEDICARE

## 2024-06-27 DIAGNOSIS — R73.01 IFG (IMPAIRED FASTING GLUCOSE): ICD-10-CM

## 2024-06-27 DIAGNOSIS — Z12.5 PROSTATE CANCER SCREENING: ICD-10-CM

## 2024-06-27 DIAGNOSIS — E03.9 HYPOTHYROID: ICD-10-CM

## 2024-06-27 DIAGNOSIS — E03.9 HYPOTHYROIDISM, UNSPECIFIED TYPE: ICD-10-CM

## 2024-06-27 DIAGNOSIS — E55.9 VITAMIN D DEFICIENCY: ICD-10-CM

## 2024-06-27 DIAGNOSIS — I10 PRIMARY HYPERTENSION: ICD-10-CM

## 2024-06-27 LAB
25(OH)D3+25(OH)D2 SERPL-MCNC: 46 NG/ML (ref 30–96)
ALBUMIN SERPL BCP-MCNC: 2.9 G/DL (ref 3.5–5.2)
ALBUMIN/CREAT UR: 10.6 UG/MG (ref 0–30)
ALP SERPL-CCNC: 138 U/L (ref 55–135)
ALT SERPL W/O P-5'-P-CCNC: 14 U/L (ref 10–44)
ANION GAP SERPL CALC-SCNC: 5 MMOL/L (ref 3–11)
AST SERPL-CCNC: 10 U/L (ref 10–40)
BASOPHILS # BLD AUTO: 0.03 K/UL (ref 0–0.2)
BASOPHILS NFR BLD: 0.5 % (ref 0–1.9)
BILIRUB SERPL-MCNC: 0.7 MG/DL (ref 0.1–1)
BUN SERPL-MCNC: 23 MG/DL (ref 8–23)
CALCIUM SERPL-MCNC: 8.9 MG/DL (ref 8.7–10.5)
CHLORIDE SERPL-SCNC: 103 MMOL/L (ref 95–110)
CHOLEST SERPL-MCNC: 113 MG/DL (ref 120–199)
CHOLEST/HDLC SERPL: 2.3 {RATIO} (ref 2–5)
CO2 SERPL-SCNC: 31 MMOL/L (ref 23–29)
COMPLEXED PSA SERPL-MCNC: 0.68 NG/ML (ref 0–4)
CREAT SERPL-MCNC: 1.4 MG/DL (ref 0.5–1.4)
CREAT UR-MCNC: 96.1 MG/DL (ref 23–375)
DIFFERENTIAL METHOD BLD: ABNORMAL
EOSINOPHIL # BLD AUTO: 0.2 K/UL (ref 0–0.5)
EOSINOPHIL NFR BLD: 3.4 % (ref 0–8)
ERYTHROCYTE [DISTWIDTH] IN BLOOD BY AUTOMATED COUNT: 15 % (ref 11.5–14.5)
EST. GFR  (NO RACE VARIABLE): 48.3 ML/MIN/1.73 M^2
GLUCOSE SERPL-MCNC: 108 MG/DL (ref 70–110)
HCT VFR BLD AUTO: 40.3 % (ref 40–54)
HDLC SERPL-MCNC: 50 MG/DL (ref 40–75)
HDLC SERPL: 44.2 % (ref 20–50)
HGB BLD-MCNC: 13 G/DL (ref 14–18)
IMM GRANULOCYTES # BLD AUTO: 0.01 K/UL (ref 0–0.04)
IMM GRANULOCYTES NFR BLD AUTO: 0.2 % (ref 0–0.5)
LDLC SERPL CALC-MCNC: 48.2 MG/DL (ref 63–159)
LYMPHOCYTES # BLD AUTO: 1.2 K/UL (ref 1–4.8)
LYMPHOCYTES NFR BLD: 21.5 % (ref 18–48)
MCH RBC QN AUTO: 30.2 PG (ref 27–31)
MCHC RBC AUTO-ENTMCNC: 32.3 G/DL (ref 32–36)
MCV RBC AUTO: 94 FL (ref 82–98)
MICROALBUMIN UR DL<=1MG/L-MCNC: 10.2 MG/L
MONOCYTES # BLD AUTO: 0.5 K/UL (ref 0.3–1)
MONOCYTES NFR BLD: 8.8 % (ref 4–15)
NEUTROPHILS # BLD AUTO: 3.7 K/UL (ref 1.8–7.7)
NEUTROPHILS NFR BLD: 65.6 % (ref 38–73)
NONHDLC SERPL-MCNC: 63 MG/DL
NRBC BLD-RTO: 0 /100 WBC
PLATELET # BLD AUTO: 210 K/UL (ref 150–450)
PMV BLD AUTO: 11.6 FL (ref 9.2–12.9)
POTASSIUM SERPL-SCNC: 4.7 MMOL/L (ref 3.5–5.1)
PROT SERPL-MCNC: 7 G/DL (ref 6–8.4)
RBC # BLD AUTO: 4.31 M/UL (ref 4.6–6.2)
SODIUM SERPL-SCNC: 139 MMOL/L (ref 136–145)
T4 FREE SERPL-MCNC: 1.33 NG/DL (ref 0.71–1.51)
TRIGL SERPL-MCNC: 74 MG/DL (ref 30–150)
TSH SERPL DL<=0.005 MIU/L-ACNC: 2.11 UIU/ML (ref 0.4–4)
WBC # BLD AUTO: 5.59 K/UL (ref 3.9–12.7)

## 2024-06-27 PROCEDURE — 84439 ASSAY OF FREE THYROXINE: CPT | Performed by: INTERNAL MEDICINE

## 2024-06-27 PROCEDURE — 82306 VITAMIN D 25 HYDROXY: CPT | Performed by: INTERNAL MEDICINE

## 2024-06-27 PROCEDURE — 80061 LIPID PANEL: CPT | Performed by: INTERNAL MEDICINE

## 2024-06-27 PROCEDURE — 36415 COLL VENOUS BLD VENIPUNCTURE: CPT | Performed by: INTERNAL MEDICINE

## 2024-06-27 PROCEDURE — 85025 COMPLETE CBC W/AUTO DIFF WBC: CPT | Performed by: INTERNAL MEDICINE

## 2024-06-27 PROCEDURE — 84443 ASSAY THYROID STIM HORMONE: CPT | Performed by: INTERNAL MEDICINE

## 2024-06-27 PROCEDURE — 82570 ASSAY OF URINE CREATININE: CPT | Performed by: INTERNAL MEDICINE

## 2024-06-27 PROCEDURE — 80053 COMPREHEN METABOLIC PANEL: CPT | Performed by: INTERNAL MEDICINE

## 2024-06-27 PROCEDURE — 84153 ASSAY OF PSA TOTAL: CPT | Performed by: INTERNAL MEDICINE

## 2024-07-22 PROBLEM — R10.9 RIGHT FLANK PAIN: Status: ACTIVE | Noted: 2024-07-22

## 2024-07-22 PROBLEM — R39.16 BENIGN PROSTATIC HYPERPLASIA (BPH) WITH STRAINING ON URINATION: Status: ACTIVE | Noted: 2024-07-22

## 2024-07-22 PROBLEM — N40.1 BENIGN PROSTATIC HYPERPLASIA (BPH) WITH STRAINING ON URINATION: Status: ACTIVE | Noted: 2024-07-22

## 2024-07-25 ENCOUNTER — HOSPITAL ENCOUNTER (OUTPATIENT)
Dept: RADIOLOGY | Facility: HOSPITAL | Age: 89
Discharge: HOME | End: 2024-07-25
Attending: INTERNAL MEDICINE
Payer: MEDICARE

## 2024-07-25 DIAGNOSIS — M54.41 ACUTE BILATERAL LOW BACK PAIN WITH BILATERAL SCIATICA: ICD-10-CM

## 2024-07-25 DIAGNOSIS — M54.42 ACUTE BILATERAL LOW BACK PAIN WITH BILATERAL SCIATICA: ICD-10-CM

## 2024-07-25 PROCEDURE — 72100 X-RAY EXAM L-S SPINE 2/3 VWS: CPT | Mod: TC

## 2024-07-25 PROCEDURE — 73521 X-RAY EXAM HIPS BI 2 VIEWS: CPT | Mod: TC

## 2024-08-06 ENCOUNTER — LAB VISIT (OUTPATIENT)
Dept: LAB | Facility: HOSPITAL | Age: 89
End: 2024-08-06
Attending: INTERNAL MEDICINE
Payer: MEDICARE

## 2024-08-06 DIAGNOSIS — M54.42 ACUTE BILATERAL LOW BACK PAIN WITH BILATERAL SCIATICA: ICD-10-CM

## 2024-08-06 DIAGNOSIS — R10.9 FLANK PAIN: ICD-10-CM

## 2024-08-06 DIAGNOSIS — M54.41 ACUTE BILATERAL LOW BACK PAIN WITH BILATERAL SCIATICA: ICD-10-CM

## 2024-08-06 DIAGNOSIS — N39.41 URGENCY INCONTINENCE: ICD-10-CM

## 2024-08-06 DIAGNOSIS — R35.0 FREQUENCY OF MICTURITION: ICD-10-CM

## 2024-08-06 LAB
ALBUMIN SERPL BCP-MCNC: 3.1 G/DL (ref 3.5–5.2)
ALP SERPL-CCNC: 148 U/L (ref 55–135)
ALT SERPL W/O P-5'-P-CCNC: 27 U/L (ref 10–44)
AMYLASE SERPL-CCNC: 57 U/L (ref 20–110)
ANION GAP SERPL CALC-SCNC: 11 MMOL/L (ref 3–11)
AST SERPL-CCNC: 20 U/L (ref 10–40)
BASOPHILS # BLD AUTO: 0.03 K/UL (ref 0–0.2)
BASOPHILS NFR BLD: 0.5 % (ref 0–1.9)
BILIRUB SERPL-MCNC: 0.5 MG/DL (ref 0.1–1)
BUN SERPL-MCNC: 23 MG/DL (ref 8–23)
CALCIUM SERPL-MCNC: 8.7 MG/DL (ref 8.7–10.5)
CHLORIDE SERPL-SCNC: 102 MMOL/L (ref 95–110)
CO2 SERPL-SCNC: 31 MMOL/L (ref 23–29)
COMPLEXED PSA SERPL-MCNC: 0.96 NG/ML (ref 0–4)
CREAT SERPL-MCNC: 1.4 MG/DL (ref 0.5–1.4)
CRP SERPL-MCNC: 0.53 MG/DL (ref 0–0.75)
DIFFERENTIAL METHOD BLD: ABNORMAL
EOSINOPHIL # BLD AUTO: 0.5 K/UL (ref 0–0.5)
EOSINOPHIL NFR BLD: 7.8 % (ref 0–8)
ERYTHROCYTE [DISTWIDTH] IN BLOOD BY AUTOMATED COUNT: 14.9 % (ref 11.5–14.5)
ERYTHROCYTE [SEDIMENTATION RATE] IN BLOOD: 50 MM/HR (ref 0–10)
EST. GFR  (NO RACE VARIABLE): 48.3 ML/MIN/1.73 M^2
GLUCOSE SERPL-MCNC: 106 MG/DL (ref 70–110)
HCT VFR BLD AUTO: 39.9 % (ref 40–54)
HGB BLD-MCNC: 13 G/DL (ref 14–18)
IMM GRANULOCYTES # BLD AUTO: 0.02 K/UL (ref 0–0.04)
IMM GRANULOCYTES NFR BLD AUTO: 0.3 % (ref 0–0.5)
LIPASE SERPL-CCNC: 47 U/L (ref 13–75)
LYMPHOCYTES # BLD AUTO: 1 K/UL (ref 1–4.8)
LYMPHOCYTES NFR BLD: 16.8 % (ref 18–48)
MAGNESIUM SERPL-MCNC: 2 MG/DL (ref 1.6–2.6)
MCH RBC QN AUTO: 30.3 PG (ref 27–31)
MCHC RBC AUTO-ENTMCNC: 32.6 G/DL (ref 32–36)
MCV RBC AUTO: 93 FL (ref 82–98)
MONOCYTES # BLD AUTO: 0.5 K/UL (ref 0.3–1)
MONOCYTES NFR BLD: 8.6 % (ref 4–15)
NEUTROPHILS # BLD AUTO: 3.8 K/UL (ref 1.8–7.7)
NEUTROPHILS NFR BLD: 66 % (ref 38–73)
NRBC BLD-RTO: 0 /100 WBC
PHOSPHATE SERPL-MCNC: 4.3 MG/DL (ref 2.7–4.5)
PLATELET # BLD AUTO: 198 K/UL (ref 150–450)
PMV BLD AUTO: 11 FL (ref 9.2–12.9)
POTASSIUM SERPL-SCNC: 4.2 MMOL/L (ref 3.5–5.1)
PROT SERPL-MCNC: 6.9 G/DL (ref 6–8.4)
RBC # BLD AUTO: 4.29 M/UL (ref 4.6–6.2)
SODIUM SERPL-SCNC: 144 MMOL/L (ref 136–145)
WBC # BLD AUTO: 5.79 K/UL (ref 3.9–12.7)

## 2024-08-06 PROCEDURE — 85651 RBC SED RATE NONAUTOMATED: CPT | Performed by: INTERNAL MEDICINE

## 2024-08-06 PROCEDURE — 83735 ASSAY OF MAGNESIUM: CPT | Performed by: INTERNAL MEDICINE

## 2024-08-06 PROCEDURE — 87086 URINE CULTURE/COLONY COUNT: CPT | Performed by: INTERNAL MEDICINE

## 2024-08-06 PROCEDURE — 36415 COLL VENOUS BLD VENIPUNCTURE: CPT | Performed by: INTERNAL MEDICINE

## 2024-08-06 PROCEDURE — 85025 COMPLETE CBC W/AUTO DIFF WBC: CPT | Performed by: INTERNAL MEDICINE

## 2024-08-06 PROCEDURE — 86140 C-REACTIVE PROTEIN: CPT | Performed by: INTERNAL MEDICINE

## 2024-08-06 PROCEDURE — 83690 ASSAY OF LIPASE: CPT | Performed by: INTERNAL MEDICINE

## 2024-08-06 PROCEDURE — 80053 COMPREHEN METABOLIC PANEL: CPT | Performed by: INTERNAL MEDICINE

## 2024-08-06 PROCEDURE — 82150 ASSAY OF AMYLASE: CPT | Performed by: INTERNAL MEDICINE

## 2024-08-06 PROCEDURE — 84153 ASSAY OF PSA TOTAL: CPT | Performed by: INTERNAL MEDICINE

## 2024-08-06 PROCEDURE — 84100 ASSAY OF PHOSPHORUS: CPT | Performed by: INTERNAL MEDICINE

## 2024-08-07 LAB
BACTERIA UR CULT: NORMAL
BACTERIA UR CULT: NORMAL

## 2024-08-09 ENCOUNTER — HOSPITAL ENCOUNTER (OUTPATIENT)
Dept: RADIOLOGY | Facility: HOSPITAL | Age: 89
Discharge: HOME | End: 2024-08-09
Attending: NURSE PRACTITIONER
Payer: MEDICARE

## 2024-08-09 ENCOUNTER — HOSPITAL ENCOUNTER (OUTPATIENT)
Dept: RADIOLOGY | Facility: HOSPITAL | Age: 89
Discharge: HOME | End: 2024-08-09
Attending: INTERNAL MEDICINE
Payer: MEDICARE

## 2024-08-09 DIAGNOSIS — R19.4 CHANGE IN BOWEL HABITS: ICD-10-CM

## 2024-08-09 DIAGNOSIS — R10.31 ABDOMINAL PAIN, RIGHT LOWER QUADRANT: ICD-10-CM

## 2024-08-09 DIAGNOSIS — Z85.038 PERSONAL HISTORY OF COLON CANCER: ICD-10-CM

## 2024-08-09 DIAGNOSIS — K57.30 DIVERTICULOSIS OF COLON: ICD-10-CM

## 2024-08-09 DIAGNOSIS — R91.1 LUNG NODULE: ICD-10-CM

## 2024-08-09 DIAGNOSIS — Z80.0 FAMILY HISTORY OF COLON CANCER: ICD-10-CM

## 2024-08-09 PROCEDURE — 74178 CT ABD&PLV WO CNTR FLWD CNTR: CPT | Mod: TC

## 2024-08-09 PROCEDURE — 25500020 PHARM REV CODE 255: Performed by: NURSE PRACTITIONER

## 2024-08-09 PROCEDURE — 71270 CT THORAX DX C-/C+: CPT | Mod: TC

## 2024-08-09 RX ADMIN — IOHEXOL 100 ML: 350 INJECTION, SOLUTION INTRAVENOUS at 10:08

## 2024-09-04 ENCOUNTER — HOSPITAL ENCOUNTER (OUTPATIENT)
Dept: RADIOLOGY | Facility: HOSPITAL | Age: 89
Discharge: HOME | End: 2024-09-04
Attending: INTERNAL MEDICINE
Payer: MEDICARE
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
## 2024-09-04 PROCEDURE — 70450 CT HEAD/BRAIN W/O DYE: CPT | Mod: TC

## 2024-10-21 ENCOUNTER — PATIENT MESSAGE (OUTPATIENT)
Dept: FAMILY MEDICINE | Facility: CLINIC | Age: 89
End: 2024-10-21
Payer: MEDICARE

## 2024-10-25 PROBLEM — Z23 NEED FOR VACCINATION: Status: ACTIVE | Noted: 2024-10-25

## 2024-11-21 ENCOUNTER — HOSPITAL ENCOUNTER (OUTPATIENT)
Facility: HOSPITAL | Age: 89
Discharge: HOME | End: 2024-11-22
Attending: STUDENT IN AN ORGANIZED HEALTH CARE EDUCATION/TRAINING PROGRAM | Admitting: STUDENT IN AN ORGANIZED HEALTH CARE EDUCATION/TRAINING PROGRAM
Payer: MEDICARE

## 2024-11-21 ENCOUNTER — OFFICE VISIT (OUTPATIENT)
Dept: SURGERY | Facility: CLINIC | Age: 89
End: 2024-11-21
Payer: MEDICARE

## 2024-11-21 VITALS
HEART RATE: 68 BPM | BODY MASS INDEX: 34.62 KG/M2 | HEIGHT: 70 IN | WEIGHT: 241.81 LBS | RESPIRATION RATE: 18 BRPM | OXYGEN SATURATION: 97 % | SYSTOLIC BLOOD PRESSURE: 154 MMHG | DIASTOLIC BLOOD PRESSURE: 67 MMHG

## 2024-11-21 DIAGNOSIS — L02.222 FURUNCLE OF BACK, EXCEPT BUTTOCK: ICD-10-CM

## 2024-11-21 DIAGNOSIS — L02.212 ABSCESS OF BACK: Primary | ICD-10-CM

## 2024-11-21 DIAGNOSIS — L02.212 ABSCESS OF BACK: ICD-10-CM

## 2024-11-21 DIAGNOSIS — L72.0 RUPTURED EPITHELIAL INCLUSION CYST: Primary | ICD-10-CM

## 2024-11-21 DIAGNOSIS — L02.232: ICD-10-CM

## 2024-11-21 DIAGNOSIS — G89.18 POST-OPERATIVE PAIN: ICD-10-CM

## 2024-11-21 LAB
ALBUMIN SERPL BCP-MCNC: 2.8 G/DL (ref 3.5–5.2)
ALP SERPL-CCNC: 155 U/L (ref 55–135)
ALT SERPL W/O P-5'-P-CCNC: 14 U/L (ref 10–44)
ANION GAP SERPL CALC-SCNC: 3 MMOL/L (ref 3–11)
AST SERPL-CCNC: 12 U/L (ref 10–40)
BASOPHILS # BLD AUTO: 0.02 K/UL (ref 0–0.2)
BASOPHILS NFR BLD: 0.3 % (ref 0–1.9)
BILIRUB SERPL-MCNC: 0.3 MG/DL (ref 0.1–1)
BUN SERPL-MCNC: 23 MG/DL (ref 8–23)
CALCIUM SERPL-MCNC: 8.4 MG/DL (ref 8.7–10.5)
CHLORIDE SERPL-SCNC: 103 MMOL/L (ref 95–110)
CO2 SERPL-SCNC: 34 MMOL/L (ref 23–29)
CREAT SERPL-MCNC: 1.6 MG/DL (ref 0.5–1.4)
DIFFERENTIAL METHOD BLD: ABNORMAL
EOSINOPHIL # BLD AUTO: 0.3 K/UL (ref 0–0.5)
EOSINOPHIL NFR BLD: 4.8 % (ref 0–8)
ERYTHROCYTE [DISTWIDTH] IN BLOOD BY AUTOMATED COUNT: 14.7 % (ref 11.5–14.5)
EST. GFR  (NO RACE VARIABLE): 41.2 ML/MIN/1.73 M^2
ESTIMATED AVG GLUCOSE: 123 MG/DL (ref 68–131)
GLUCOSE SERPL-MCNC: 98 MG/DL (ref 70–110)
HBA1C MFR BLD: 5.9 % (ref 4–5.6)
HCT VFR BLD AUTO: 39.3 % (ref 40–54)
HGB BLD-MCNC: 12.9 G/DL (ref 14–18)
IMM GRANULOCYTES # BLD AUTO: 0.01 K/UL (ref 0–0.04)
IMM GRANULOCYTES NFR BLD AUTO: 0.2 % (ref 0–0.5)
LYMPHOCYTES # BLD AUTO: 1.1 K/UL (ref 1–4.8)
LYMPHOCYTES NFR BLD: 18 % (ref 18–48)
MAGNESIUM SERPL-MCNC: 1.7 MG/DL (ref 1.6–2.6)
MCH RBC QN AUTO: 30.6 PG (ref 27–31)
MCHC RBC AUTO-ENTMCNC: 32.8 G/DL (ref 32–36)
MCV RBC AUTO: 93 FL (ref 82–98)
MONOCYTES # BLD AUTO: 0.6 K/UL (ref 0.3–1)
MONOCYTES NFR BLD: 10.5 % (ref 4–15)
NEUTROPHILS # BLD AUTO: 3.9 K/UL (ref 1.8–7.7)
NEUTROPHILS NFR BLD: 66.2 % (ref 38–73)
NRBC BLD-RTO: 0 /100 WBC
PHOSPHATE SERPL-MCNC: 3.1 MG/DL (ref 2.7–4.5)
PLATELET # BLD AUTO: 211 K/UL (ref 150–450)
PMV BLD AUTO: 10.6 FL (ref 9.2–12.9)
POTASSIUM SERPL-SCNC: 3.9 MMOL/L (ref 3.5–5.1)
PROT SERPL-MCNC: 6.5 G/DL (ref 6–8.4)
RBC # BLD AUTO: 4.21 M/UL (ref 4.6–6.2)
SODIUM SERPL-SCNC: 140 MMOL/L (ref 136–145)
WBC # BLD AUTO: 5.83 K/UL (ref 3.9–12.7)

## 2024-11-21 PROCEDURE — 99999 PR PBB SHADOW E&M-EST. PATIENT-LVL III: CPT | Mod: PBBFAC,,, | Performed by: STUDENT IN AN ORGANIZED HEALTH CARE EDUCATION/TRAINING PROGRAM

## 2024-11-21 PROCEDURE — 87075 CULTR BACTERIA EXCEPT BLOOD: CPT | Performed by: STUDENT IN AN ORGANIZED HEALTH CARE EDUCATION/TRAINING PROGRAM

## 2024-11-21 PROCEDURE — 99213 OFFICE O/P EST LOW 20 MIN: CPT | Mod: PBBFAC | Performed by: STUDENT IN AN ORGANIZED HEALTH CARE EDUCATION/TRAINING PROGRAM

## 2024-11-21 PROCEDURE — 99499 UNLISTED E&M SERVICE: CPT | Mod: S$PBB,,, | Performed by: STUDENT IN AN ORGANIZED HEALTH CARE EDUCATION/TRAINING PROGRAM

## 2024-11-21 PROCEDURE — 87186 SC STD MICRODIL/AGAR DIL: CPT | Performed by: STUDENT IN AN ORGANIZED HEALTH CARE EDUCATION/TRAINING PROGRAM

## 2024-11-21 PROCEDURE — 83735 ASSAY OF MAGNESIUM: CPT | Performed by: STUDENT IN AN ORGANIZED HEALTH CARE EDUCATION/TRAINING PROGRAM

## 2024-11-21 PROCEDURE — G0379 DIRECT REFER HOSPITAL OBSERV: HCPCS

## 2024-11-21 PROCEDURE — 84100 ASSAY OF PHOSPHORUS: CPT | Performed by: STUDENT IN AN ORGANIZED HEALTH CARE EDUCATION/TRAINING PROGRAM

## 2024-11-21 PROCEDURE — 63600175 PHARM REV CODE 636 W HCPCS: Performed by: STUDENT IN AN ORGANIZED HEALTH CARE EDUCATION/TRAINING PROGRAM

## 2024-11-21 PROCEDURE — 87205 SMEAR GRAM STAIN: CPT | Performed by: STUDENT IN AN ORGANIZED HEALTH CARE EDUCATION/TRAINING PROGRAM

## 2024-11-21 PROCEDURE — 25000003 PHARM REV CODE 250: Performed by: STUDENT IN AN ORGANIZED HEALTH CARE EDUCATION/TRAINING PROGRAM

## 2024-11-21 PROCEDURE — 99900035 HC TECH TIME PER 15 MIN (STAT)

## 2024-11-21 PROCEDURE — 36415 COLL VENOUS BLD VENIPUNCTURE: CPT | Performed by: STUDENT IN AN ORGANIZED HEALTH CARE EDUCATION/TRAINING PROGRAM

## 2024-11-21 PROCEDURE — 87070 CULTURE OTHR SPECIMN AEROBIC: CPT | Performed by: STUDENT IN AN ORGANIZED HEALTH CARE EDUCATION/TRAINING PROGRAM

## 2024-11-21 PROCEDURE — 87077 CULTURE AEROBIC IDENTIFY: CPT | Performed by: STUDENT IN AN ORGANIZED HEALTH CARE EDUCATION/TRAINING PROGRAM

## 2024-11-21 PROCEDURE — G0378 HOSPITAL OBSERVATION PER HR: HCPCS

## 2024-11-21 PROCEDURE — 80053 COMPREHEN METABOLIC PANEL: CPT | Performed by: STUDENT IN AN ORGANIZED HEALTH CARE EDUCATION/TRAINING PROGRAM

## 2024-11-21 PROCEDURE — 83036 HEMOGLOBIN GLYCOSYLATED A1C: CPT | Performed by: STUDENT IN AN ORGANIZED HEALTH CARE EDUCATION/TRAINING PROGRAM

## 2024-11-21 PROCEDURE — 99223 1ST HOSP IP/OBS HIGH 75: CPT | Mod: AI,,, | Performed by: STUDENT IN AN ORGANIZED HEALTH CARE EDUCATION/TRAINING PROGRAM

## 2024-11-21 PROCEDURE — 85025 COMPLETE CBC W/AUTO DIFF WBC: CPT | Performed by: STUDENT IN AN ORGANIZED HEALTH CARE EDUCATION/TRAINING PROGRAM

## 2024-11-21 RX ORDER — ALBUTEROL SULFATE 90 UG/1
2 INHALANT RESPIRATORY (INHALATION) EVERY 6 HOURS PRN
Status: DISCONTINUED | OUTPATIENT
Start: 2024-11-21 | End: 2024-11-22 | Stop reason: HOSPADM

## 2024-11-21 RX ORDER — TRAZODONE HYDROCHLORIDE 50 MG/1
50 TABLET ORAL NIGHTLY
Status: DISCONTINUED | OUTPATIENT
Start: 2024-11-21 | End: 2024-11-22 | Stop reason: HOSPADM

## 2024-11-21 RX ORDER — SODIUM CHLORIDE 0.9 % (FLUSH) 0.9 %
10 SYRINGE (ML) INJECTION
Status: DISCONTINUED | OUTPATIENT
Start: 2024-11-21 | End: 2024-11-22 | Stop reason: HOSPADM

## 2024-11-21 RX ORDER — LIDOCAINE HYDROCHLORIDE 10 MG/ML
1 INJECTION, SOLUTION EPIDURAL; INFILTRATION; INTRACAUDAL; PERINEURAL ONCE AS NEEDED
Status: DISCONTINUED | OUTPATIENT
Start: 2024-11-21 | End: 2024-11-22 | Stop reason: HOSPADM

## 2024-11-21 RX ORDER — FUROSEMIDE 20 MG/1
20 TABLET ORAL 2 TIMES DAILY
Status: DISCONTINUED | OUTPATIENT
Start: 2024-11-22 | End: 2024-11-22 | Stop reason: HOSPADM

## 2024-11-21 RX ORDER — CARVEDILOL 3.12 MG/1
3.12 TABLET ORAL DAILY
Status: DISCONTINUED | OUTPATIENT
Start: 2024-11-22 | End: 2024-11-22 | Stop reason: HOSPADM

## 2024-11-21 RX ORDER — SODIUM CHLORIDE, SODIUM LACTATE, POTASSIUM CHLORIDE, CALCIUM CHLORIDE 600; 310; 30; 20 MG/100ML; MG/100ML; MG/100ML; MG/100ML
INJECTION, SOLUTION INTRAVENOUS CONTINUOUS
Status: DISCONTINUED | OUTPATIENT
Start: 2024-11-21 | End: 2024-11-22 | Stop reason: HOSPADM

## 2024-11-21 RX ORDER — TALC
6 POWDER (GRAM) TOPICAL NIGHTLY PRN
Status: DISCONTINUED | OUTPATIENT
Start: 2024-11-21 | End: 2024-11-22 | Stop reason: HOSPADM

## 2024-11-21 RX ORDER — GLUCAGON 1 MG
1 KIT INJECTION
Status: DISCONTINUED | OUTPATIENT
Start: 2024-11-21 | End: 2024-11-22 | Stop reason: HOSPADM

## 2024-11-21 RX ORDER — ONDANSETRON 4 MG/1
8 TABLET, ORALLY DISINTEGRATING ORAL EVERY 8 HOURS PRN
Status: DISCONTINUED | OUTPATIENT
Start: 2024-11-21 | End: 2024-11-22 | Stop reason: HOSPADM

## 2024-11-21 RX ORDER — ACETAMINOPHEN 325 MG/1
650 TABLET ORAL EVERY 8 HOURS PRN
Status: DISCONTINUED | OUTPATIENT
Start: 2024-11-21 | End: 2024-11-22 | Stop reason: HOSPADM

## 2024-11-21 RX ORDER — INSULIN ASPART 100 [IU]/ML
0-10 INJECTION, SOLUTION INTRAVENOUS; SUBCUTANEOUS EVERY 6 HOURS PRN
Status: DISCONTINUED | OUTPATIENT
Start: 2024-11-21 | End: 2024-11-22 | Stop reason: HOSPADM

## 2024-11-21 RX ADMIN — PIPERACILLIN AND TAZOBACTAM 4.5 G: 4; .5 INJECTION, POWDER, LYOPHILIZED, FOR SOLUTION INTRAVENOUS; PARENTERAL at 08:11

## 2024-11-21 RX ADMIN — SODIUM CHLORIDE, POTASSIUM CHLORIDE, SODIUM LACTATE AND CALCIUM CHLORIDE: 600; 310; 30; 20 INJECTION, SOLUTION INTRAVENOUS at 08:11

## 2024-11-22 ENCOUNTER — ANESTHESIA EVENT (OUTPATIENT)
Dept: SURGERY | Facility: HOSPITAL | Age: 89
End: 2024-11-22
Payer: MEDICARE

## 2024-11-22 ENCOUNTER — ANESTHESIA (OUTPATIENT)
Dept: SURGERY | Facility: HOSPITAL | Age: 89
End: 2024-11-22
Payer: MEDICARE

## 2024-11-22 VITALS
TEMPERATURE: 98 F | DIASTOLIC BLOOD PRESSURE: 62 MMHG | BODY MASS INDEX: 34.29 KG/M2 | RESPIRATION RATE: 24 BRPM | WEIGHT: 239.5 LBS | OXYGEN SATURATION: 99 % | HEART RATE: 74 BPM | SYSTOLIC BLOOD PRESSURE: 134 MMHG | HEIGHT: 70 IN

## 2024-11-22 PROBLEM — L02.212 ABSCESS OF BACK: Status: ACTIVE | Noted: 2024-11-22

## 2024-11-22 PROBLEM — L72.0 RUPTURED EPITHELIAL INCLUSION CYST: Status: ACTIVE | Noted: 2024-11-21

## 2024-11-22 LAB
GRAM STN SPEC: NORMAL
GRAM STN SPEC: NORMAL

## 2024-11-22 PROCEDURE — 36000705 HC OR TIME LEV I EA ADD 15 MIN: Performed by: STUDENT IN AN ORGANIZED HEALTH CARE EDUCATION/TRAINING PROGRAM

## 2024-11-22 PROCEDURE — 36000704 HC OR TIME LEV I 1ST 15 MIN: Performed by: STUDENT IN AN ORGANIZED HEALTH CARE EDUCATION/TRAINING PROGRAM

## 2024-11-22 PROCEDURE — 63600175 PHARM REV CODE 636 W HCPCS: Performed by: NURSE ANESTHETIST, CERTIFIED REGISTERED

## 2024-11-22 PROCEDURE — 99024 POSTOP FOLLOW-UP VISIT: CPT | Mod: ,,, | Performed by: STUDENT IN AN ORGANIZED HEALTH CARE EDUCATION/TRAINING PROGRAM

## 2024-11-22 PROCEDURE — C1729 CATH, DRAINAGE: HCPCS | Performed by: STUDENT IN AN ORGANIZED HEALTH CARE EDUCATION/TRAINING PROGRAM

## 2024-11-22 PROCEDURE — 71000033 HC RECOVERY, INTIAL HOUR: Performed by: STUDENT IN AN ORGANIZED HEALTH CARE EDUCATION/TRAINING PROGRAM

## 2024-11-22 PROCEDURE — 10061 I&D ABSCESS COMP/MULTIPLE: CPT | Mod: ,,, | Performed by: STUDENT IN AN ORGANIZED HEALTH CARE EDUCATION/TRAINING PROGRAM

## 2024-11-22 PROCEDURE — 63600175 PHARM REV CODE 636 W HCPCS: Performed by: STUDENT IN AN ORGANIZED HEALTH CARE EDUCATION/TRAINING PROGRAM

## 2024-11-22 PROCEDURE — 25000003 PHARM REV CODE 250: Performed by: STUDENT IN AN ORGANIZED HEALTH CARE EDUCATION/TRAINING PROGRAM

## 2024-11-22 PROCEDURE — 37000008 HC ANESTHESIA 1ST 15 MINUTES: Performed by: STUDENT IN AN ORGANIZED HEALTH CARE EDUCATION/TRAINING PROGRAM

## 2024-11-22 PROCEDURE — 37000009 HC ANESTHESIA EA ADD 15 MINS: Performed by: STUDENT IN AN ORGANIZED HEALTH CARE EDUCATION/TRAINING PROGRAM

## 2024-11-22 PROCEDURE — G0378 HOSPITAL OBSERVATION PER HR: HCPCS

## 2024-11-22 RX ORDER — DIPHENHYDRAMINE HYDROCHLORIDE 50 MG/ML
25 INJECTION INTRAMUSCULAR; INTRAVENOUS EVERY 6 HOURS PRN
OUTPATIENT
Start: 2024-11-22

## 2024-11-22 RX ORDER — PHENYLEPHRINE HYDROCHLORIDE 10 MG/ML
INJECTION INTRAVENOUS
Status: DISCONTINUED | OUTPATIENT
Start: 2024-11-22 | End: 2024-11-22

## 2024-11-22 RX ORDER — LIDOCAINE HYDROCHLORIDE 10 MG/ML
INJECTION, SOLUTION EPIDURAL; INFILTRATION; INTRACAUDAL; PERINEURAL
Status: DISCONTINUED | OUTPATIENT
Start: 2024-11-22 | End: 2024-11-22 | Stop reason: HOSPADM

## 2024-11-22 RX ORDER — HYDROMORPHONE HYDROCHLORIDE 2 MG/ML
0.5 INJECTION, SOLUTION INTRAMUSCULAR; INTRAVENOUS; SUBCUTANEOUS EVERY 5 MIN PRN
OUTPATIENT
Start: 2024-11-22

## 2024-11-22 RX ORDER — FENTANYL CITRATE 50 UG/ML
INJECTION, SOLUTION INTRAMUSCULAR; INTRAVENOUS
Status: DISCONTINUED | OUTPATIENT
Start: 2024-11-22 | End: 2024-11-22

## 2024-11-22 RX ORDER — SODIUM CHLORIDE 9 MG/ML
INJECTION, SOLUTION INTRAVENOUS CONTINUOUS
OUTPATIENT
Start: 2024-11-22

## 2024-11-22 RX ORDER — GLUCAGON 1 MG
1 KIT INJECTION
OUTPATIENT
Start: 2024-11-22

## 2024-11-22 RX ORDER — ONDANSETRON HYDROCHLORIDE 2 MG/ML
4 INJECTION, SOLUTION INTRAVENOUS DAILY PRN
OUTPATIENT
Start: 2024-11-22

## 2024-11-22 RX ORDER — HYDROCODONE BITARTRATE AND ACETAMINOPHEN 5; 325 MG/1; MG/1
1 TABLET ORAL EVERY 6 HOURS PRN
Qty: 28 TABLET | Refills: 0 | Status: SHIPPED | OUTPATIENT
Start: 2024-11-22 | End: 2024-11-29

## 2024-11-22 RX ORDER — MORPHINE SULFATE 4 MG/ML
4 INJECTION, SOLUTION INTRAMUSCULAR; INTRAVENOUS EVERY 5 MIN PRN
OUTPATIENT
Start: 2024-11-22

## 2024-11-22 RX ORDER — CLINDAMYCIN HYDROCHLORIDE 300 MG/1
300 CAPSULE ORAL 3 TIMES DAILY
Qty: 21 CAPSULE | Refills: 0 | Status: SHIPPED | OUTPATIENT
Start: 2024-11-22 | End: 2024-11-29

## 2024-11-22 RX ORDER — PROPOFOL 10 MG/ML
INJECTION, EMULSION INTRAVENOUS
Status: DISCONTINUED | OUTPATIENT
Start: 2024-11-22 | End: 2024-11-22

## 2024-11-22 RX ADMIN — PROPOFOL 40 MG: 10 INJECTION, EMULSION INTRAVENOUS at 12:11

## 2024-11-22 RX ADMIN — PIPERACILLIN AND TAZOBACTAM 4.5 G: 4; .5 INJECTION, POWDER, LYOPHILIZED, FOR SOLUTION INTRAVENOUS; PARENTERAL at 12:11

## 2024-11-22 RX ADMIN — FENTANYL CITRATE 50 MCG: 50 INJECTION INTRAMUSCULAR; INTRAVENOUS at 12:11

## 2024-11-22 RX ADMIN — PROPOFOL 80 MG: 10 INJECTION, EMULSION INTRAVENOUS at 12:11

## 2024-11-22 RX ADMIN — PIPERACILLIN AND TAZOBACTAM 4.5 G: 4; .5 INJECTION, POWDER, LYOPHILIZED, FOR SOLUTION INTRAVENOUS; PARENTERAL at 03:11

## 2024-11-22 RX ADMIN — CARVEDILOL 3.12 MG: 3.12 TABLET, FILM COATED ORAL at 09:11

## 2024-11-22 RX ADMIN — FUROSEMIDE 20 MG: 20 TABLET ORAL at 09:11

## 2024-11-22 RX ADMIN — SODIUM CHLORIDE, POTASSIUM CHLORIDE, SODIUM LACTATE AND CALCIUM CHLORIDE: 600; 310; 30; 20 INJECTION, SOLUTION INTRAVENOUS at 09:11

## 2024-11-22 RX ADMIN — PHENYLEPHRINE HYDROCHLORIDE 100 MCG: 10 INJECTION INTRAVENOUS at 12:11

## 2024-11-22 NOTE — PLAN OF CARE
AVS reviewed w/ pt and purposeful rounding complete. IV and telemetry discontined per discharge orders. Pt discharged home w/ home health

## 2024-11-22 NOTE — PLAN OF CARE
[facility]  Discharge Final Note    Primary Care Provider: Stefan Fleming III, MD    Expected Discharge Date: 11/22/2024    Final Discharge Note (most recent)       Final Note - 11/22/24 1616          Final Note    Assessment Type Final Discharge Note     Anticipated Discharge Disposition Home-Health Care Svc        Post-Acute Status    Post-Acute Authorization Home Health     Home Health Status Set-up Complete/Auth obtained     Coverage MEDICARE - MEDICARE PART A & B     Discharge Delays None known at this time                     Important Message from Medicare              Follow-up providers       Sharifa Barrientos MD   Specialty: General Surgery    [address] [facility]  [address]  [facility] [address]   Phone: [phone]       Next Steps: Follow up in 1 week(s)              After-discharge care                Home Medical Care       [facility]   Service: Home Health Services    [address] [address]   Phone: [phone]                           Final note is completed. The patient will be discharging home with home health.

## 2024-11-22 NOTE — ANESTHESIA PREPROCEDURE EVALUATION
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      Pre-op Assessment    I have reviewed the Patient Summary Reports.    I have reviewed the NPO Status.   I have reviewed the Medications.     Review of Systems  Anesthesia Hx:  No problems with previous Anesthesia             Denies Family Hx of Anesthesia complications.    Denies Personal Hx of Anesthesia complications.                    Social:  Non-Smoker       Cardiovascular:     Hypertension, well controlled    Dysrhythmias           HX PASVT                           Pulmonary:  Pulmonary Normal                       Renal/:  Chronic Renal Disease, CKD renal calculi               Hepatic/GI:   PUD,                  Neurological:  Neurology Normal                                      Endocrine:  Diabetes, well controlled, type 2 Hypothyroidism            Lab Results   Component Value Date    WBC 5.83 11/21/2024    HGB 12.9 (L) 11/21/2024    HCT 39.3 (L) 11/21/2024    MCV 93 11/21/2024     11/21/2024      CMP  Sodium   Date Value Ref Range Status   11/21/2024 140 136 - 145 mmol/L Final     Potassium   Date Value Ref Range Status   11/21/2024 3.9 3.5 - 5.1 mmol/L Final     Chloride   Date Value Ref Range Status   11/21/2024 103 95 - 110 mmol/L Final     CO2   Date Value Ref Range Status   11/21/2024 34 (H) 23 - 29 mmol/L Final     Glucose   Date Value Ref Range Status   11/21/2024 98 70 - 110 mg/dL Final     BUN   Date Value Ref Range Status   11/21/2024 23 8 - 23 mg/dL Final     Creatinine   Date Value Ref Range Status   11/21/2024 1.6 (H) 0.5 - 1.4 mg/dL Final     Calcium   Date Value Ref Range Status   11/21/2024 8.4 (L) 8.7 - 10.5 mg/dL Final     Total Protein   Date Value Ref Range Status   11/21/2024 6.5 6.0 - 8.4 g/dL Final     Albumin   Date Value Ref Range Status   11/21/2024 2.8 (L) 3.5 - 5.2 g/dL Final     Total Bilirubin    Date Value Ref Range Status   11/21/2024 0.3 0.1 - 1.0 mg/dL Final     Comment:     For infants and newborns, interpretation of results should be based  on gestational age, weight and in agreement with clinical  observations.    Premature Infant recommended reference ranges:  Up to 24 hours.............<8.0 mg/dL  Up to 48 hours............<12.0 mg/dL  3-5 days..................<15.0 mg/dL  6-29 days.................<15.0 mg/dL    For patients on Eltrombopag therapy, use of Dimension [place] TBIL is   not   recommended.       Alkaline Phosphatase   Date Value Ref Range Status   11/21/2024 155 (H) 55 - 135 U/L Final     AST   Date Value Ref Range Status   11/21/2024 12 10 - 40 U/L Final     ALT   Date Value Ref Range Status   11/21/2024 14 10 - 44 U/L Final     Anion Gap   Date Value Ref Range Status   11/21/2024 3 3 - 11 mmol/L Final     eGFR   Date Value Ref Range Status   11/21/2024 41.2 (A) >60 mL/min/1.73 m^2 Final        Physical Exam  General: Well nourished    Airway:  Mallampati: III / II  Mouth Opening: Normal  TM Distance: Normal  Tongue: Normal  Neck ROM: Normal ROM    Dental:  Intact    Chest/Lungs:  Clear to auscultation    Heart:  Rate: Normal  Rhythm: Regular Rhythm  Sounds: Normal        Anesthesia Plan  Type of Anesthesia, risks & benefits discussed:    Anesthesia Type: MAC  Intra-op Monitoring Plan: Standard ASA Monitors  Post Op Pain Control Plan: multimodal analgesia  Induction:  IV  Airway Plan: Direct  Informed Consent: Informed consent signed with the Patient and all parties understand the risks and agree with anesthesia plan.  All questions answered.   ASA Score: 4  Day of Surgery Review of History & Physical: I have interviewed and examined the patient. I have reviewed the patient's H&P dated: There are no significant changes.     Ready For Surgery From Anesthesia Perspective.     .

## 2024-11-22 NOTE — HPI
88yo male who presented to  with infected furuncle of the back with abscess, and superficial I&D performed. Cultures obtained and orders placed for direct admission for more adequate incision and drainage under sedation. Consulted for recs r/t chronic condition mgmt. Pt. Receiving IV ABX and plans for sx placement penrose drain later this AM. Then d/c back home with [facility] for W.C and therapy eval/tx.

## 2024-11-22 NOTE — SUBJECTIVE & OBJECTIVE
Past Medical History:   Diagnosis Date    Acute blood loss anemia 12/30/2021    Acute cough     Acute gastric ulcer with hemorrhage 12/30/2021    Acute prostatitis     Acute superficial gastritis without hemorrhage 02/24/2022    ETHAN (acute kidney injury) 12/30/2021    Arthritis of finger of left hand 05/22/2023    Atrioventricular block     Bleeding ulcer     Blood pressure elevated without history of HTN     BPH (benign prostatic hyperplasia)     Bradycardia     35-60's    Calculus of gallbladder without cholecystitis without obstruction 05/22/2023    Candidiasis of skin and nails     CAP (community acquired pneumonia)     Carbuncle of back (any part, except buttock)     Cellulitis and abscess of trunk     Cellulitis of ear canal     Cervical pain (neck)     Colon polyp     Diverticulitis     Encounter for blood transfusion 2021    X3    GIB (gastrointestinal bleeding) 12/30/2021    Hematochezia     History of shortness of breath     Hypertension     IFG (impaired fasting glucose)     Iron deficiency anemia     Kidney stone     Lightheaded     Lung mass 12/09/2021    Malaise and fatigue     Obesity, unspecified     Otalgia     Palpitations     Paroxysmal supraventricular tachycardia     Pleural effusion     PND (post-nasal drip)     Skin cancer 02/2022    RIGHT FACE    Thyroid disease     Type 2 diabetes mellitus without complication, without long-term current use of insulin 1/31/2024    Unspecified cataract 02/2023    Unspecified hemorrhoids        Past Surgical History:   Procedure Laterality Date    ANGIOGRAM, CORONARY, WITH LEFT HEART CATHETERIZATION  09/01/2022    CIS    COLONOSCOPY      ESOPHAGOGASTRODUODENOSCOPY N/A 02/24/2022    Procedure: EGD (ESOPHAGOGASTRODUODENOSCOPY);  Surgeon: Duane Cardenas MD;  Location: [facility];  Service: General;  Laterality: N/A;  #1 0600    INSERTION, PACEMAKER, LEADLESS N/A 5/6/2024    Procedure: INSERTION, PACEMAKER, LEADLESS;  Surgeon: Kt Benoit MD;  Location: [facility]  CATH;  Service: Cardiology;  Laterality: N/A;    PHACOEMULSIFICATION, CATARACT, WITH IOL INSERTION Right 2/28/2023    Procedure: PHACOEMULSIFICATION, CATARACT, WITH IOL INSERTION;  Surgeon: Maldonado Garrison MD;  Location: [facility] OR;  Service: Ophthalmology;  Laterality: Right;  5th - 0800  PHACO/IOL    PHACOEMULSIFICATION, CATARACT, WITH IOL INSERTION Left 3/28/2023    Procedure: PHACOEMULSIFICATION, CATARACT, WITH IOL INSERTION;  Surgeon: Maldonado Garrison MD;  Location: [facility] OR;  Service: Ophthalmology;  Laterality: Left;  PHACO/IOL  3rd 0700    SKIN CANCER EXCISION Right 2022    RIGHT FACE    TOTAL KNEE ARTHROPLASTY Bilateral 2001       Review of patient's allergies indicates:  No Known Allergies    Current Facility-Administered Medications on File Prior to Encounter   Medication    phenylephrine-ketorolac (OMIDRIA) 1-0.3 % 500 mL irrigation    phenylephrine-ketorolac (OMIDRIA) 1-0.3 % 500 mL irrigation     Current Outpatient Medications on File Prior to Encounter   Medication Sig    albuterol (VENTOLIN HFA) 90 mcg/actuation inhaler Inhale 2 puffs into the lungs every 6 (six) hours as needed for Wheezing. Rescue    aspirin (ECOTRIN) 81 MG EC tablet 1 tablet.    carvediloL (COREG) 3.125 MG tablet Take 1 tablet (3.125 mg total) by mouth 2 (two) times daily. (Patient taking differently: Take 3.125 mg by mouth once daily.)    furosemide (LASIX) 20 MG tablet TAKE 1 TABLET BY MOUTH TWICE A DAY    levocetirizine (XYZAL) 5 MG tablet TAKE 1 TABLET BY MOUTH EVERY DAY IN THE EVENING    pantoprazole (PROTONIX) 40 MG tablet TAKE ONE TABLET BY MOUTH EVERY DAY    rosuvastatin (CRESTOR) 5 MG tablet TAKE 1 TABLET ORALLY ONCE A DAY AT NIGHT.    SYNTHROID 50 mcg tablet TAKE 1 TABLET BY MOUTH EVERY DAY    timolol maleate 0.5% (TIMOPTIC) 0.5 % Drop     travoprost (TRAVATAN Z) 0.004 % ophthalmic solution     traZODone (DESYREL) 50 MG tablet TAKE 1 TABLET BY MOUTH EVERY EVENING.    vit A/vit C/vit E/zinc/copper (PRESERVISION AREDS  ORAL) Take 2 tablets by mouth Daily.    vitamin D (VITAMIN D3) 1000 units Tab Take 1,000 Units by mouth once daily.    acetaminophen (TYLENOL) 325 MG tablet Take 2 tablets (650 mg total) by mouth every 6 (six) hours as needed for Pain. (Patient not taking: Reported on 10/25/2024)    diphenhydrAMINE (BENADRYL) 50 MG capsule Take 50mg by mouth 1 hour before contrast administration. (Patient not taking: Reported on 11/21/2024)    polyethylene glycol (GLYCOLAX) 17 gram/dose powder Mix 17g in water daily, if not effective may increase to bid. (Patient not taking: Reported on 11/21/2024)    predniSONE (DELTASONE) 10 MG tablet Take 1 tablet (10 mg total) by mouth once daily. (Patient not taking: Reported on 11/21/2024)    tamsulosin (FLOMAX) 0.4 mg Cap Take 1 capsule (0.4 mg total) by mouth once daily. (Patient not taking: Reported on 11/21/2024)     Family History       Problem Relation (Age of Onset)    Cancer Mother (83), Father    Heart attack Father (69)    Heart disease Sister (90), Sister (88)          Tobacco Use    Smoking status: Never     Passive exposure: Never    Smokeless tobacco: Never   Substance and Sexual Activity    Alcohol use: Yes     Comment: OCC    Drug use: Never    Sexual activity: Not on file     Review of Systems   Constitutional:  Positive for activity change. Negative for chills and fever.   Respiratory:  Negative for apnea, cough, chest tightness, shortness of breath and wheezing.    Cardiovascular:  Negative for chest pain and leg swelling.   Gastrointestinal:  Negative for abdominal distention, abdominal pain, constipation, diarrhea, nausea and vomiting.   Genitourinary:  Negative for difficulty urinating and dysuria.   Skin:  Positive for wound.   Neurological:  Negative for dizziness, light-headedness and headaches.   Psychiatric/Behavioral:  Negative for agitation.      Objective:     Vital Signs (Most Recent):  Temp: 97.6 °F (36.4 °C) (11/22/24 0728)  Pulse: 60 (11/22/24 0728)  Resp: 18  (11/22/24 0728)  BP: (!) 140/85 (11/22/24 0728)  SpO2: (!) 94 % (11/22/24 0728) Vital Signs (24h Range):  Temp:  [97.6 °F (36.4 °C)-97.7 °F (36.5 °C)] 97.6 °F (36.4 °C)  Pulse:  [60-76] 60  Resp:  [18-20] 18  SpO2:  [94 %-96 %] 94 %  BP: (118-140)/(61-85) 140/85     Weight: 108.6 kg (239 lb 8 oz)  Body mass index is 34.36 kg/m².     Physical Exam  Constitutional:       Appearance: Normal appearance. He is obese. He is not ill-appearing.   HENT:      Head: Normocephalic and atraumatic.      Nose: Nose normal.   Eyes:      Extraocular Movements: Extraocular movements intact.      Pupils: Pupils are equal, round, and reactive to light.   Cardiovascular:      Rate and Rhythm: Normal rate and regular rhythm.   Pulmonary:      Effort: Pulmonary effort is normal.      Breath sounds: Normal breath sounds.   Abdominal:      General: Abdomen is flat. Bowel sounds are normal. There is no distension.      Palpations: Abdomen is soft.   Musculoskeletal:      Cervical back: Normal range of motion and neck supple.      Right lower leg: No edema.      Left lower leg: No edema.   Skin:     General: Skin is warm.             Comments: Midback-wound s/p Sx I&D; covered with gauze drg.    Neurological:      Mental Status: He is alert.          Significant Labs: All pertinent labs within the past 24 hours have been reviewed.  Recent Lab Results         11/21/24 1956 11/21/24  0957        Albumin 2.8                  ALT 14         Anion Gap 3         AST 12         Baso # 0.02         Basophil % 0.3         BILIRUBIN TOTAL 0.3  Comment: For infants and newborns, interpretation of results should be based  on gestational age, weight and in agreement with clinical  observations.    Premature Infant recommended reference ranges:  Up to 24 hours.............<8.0 mg/dL  Up to 48 hours............<12.0 mg/dL  3-5 days..................<15.0 mg/dL  6-29 days.................<15.0 mg/dL    For patients on Eltrombopag therapy, use of  Dimension [place] TBIL is   not   recommended.           BUN 23         Calcium 8.4         Chloride 103         CO2 34         Creatinine 1.6         Differential Method Automated         eGFR 41.2         Eos # 0.3         Eos % 4.8         Estimated Avg Glucose 123         Glucose 98         GRAM STAIN   Moderate WBC's          Many Gram positive cocci       Gran # (ANC) 3.9         Gran % 66.2         Hematocrit 39.3         Hemoglobin 12.9         Hemoglobin A1C External 5.9  Comment: ADA Screening Guidelines:  5.7-6.4%  Consistent with prediabetes  >or=6.5%  Consistent with diabetes    High levels of fetal hemoglobin interfere with the HbA1C  assay. Heterozygous hemoglobin variants (HbS, HgC, etc)do  not significantly interfere with this assay.   However, presence of multiple variants may affect accuracy.           Immature Grans (Abs) 0.01  Comment: Mild elevation in immature granulocytes is non specific and   can be seen in a variety of conditions including stress response,   acute inflammation, trauma and pregnancy. Correlation with other   laboratory and clinical findings is essential.           Immature Granulocytes 0.2         Lymph # 1.1         Lymph % 18.0         Magnesium  1.7         MCH 30.6         MCHC 32.8         MCV 93         Mono # 0.6         Mono % 10.5         MPV 10.6         nRBC 0         Phosphorus Level 3.1         Platelet Count 211         Potassium 3.9         PROTEIN TOTAL 6.5         RBC 4.21         RDW 14.7         Sodium 140         WBC 5.83                 Significant Imaging: I have reviewed all pertinent imaging results/findings within the past 24 hours.

## 2024-11-22 NOTE — SUBJECTIVE & OBJECTIVE
Current Facility-Administered Medications on File Prior to Encounter   Medication    phenylephrine-ketorolac (OMIDRIA) 1-0.3 % 500 mL irrigation    phenylephrine-ketorolac (OMIDRIA) 1-0.3 % 500 mL irrigation     Current Outpatient Medications on File Prior to Encounter   Medication Sig    albuterol (VENTOLIN HFA) 90 mcg/actuation inhaler Inhale 2 puffs into the lungs every 6 (six) hours as needed for Wheezing. Rescue    aspirin (ECOTRIN) 81 MG EC tablet 1 tablet.    carvediloL (COREG) 3.125 MG tablet Take 1 tablet (3.125 mg total) by mouth 2 (two) times daily. (Patient taking differently: Take 3.125 mg by mouth once daily.)    furosemide (LASIX) 20 MG tablet TAKE 1 TABLET BY MOUTH TWICE A DAY    levocetirizine (XYZAL) 5 MG tablet TAKE 1 TABLET BY MOUTH EVERY DAY IN THE EVENING    pantoprazole (PROTONIX) 40 MG tablet TAKE ONE TABLET BY MOUTH EVERY DAY    rosuvastatin (CRESTOR) 5 MG tablet TAKE 1 TABLET ORALLY ONCE A DAY AT NIGHT.    SYNTHROID 50 mcg tablet TAKE 1 TABLET BY MOUTH EVERY DAY    timolol maleate 0.5% (TIMOPTIC) 0.5 % Drop     travoprost (TRAVATAN Z) 0.004 % ophthalmic solution     traZODone (DESYREL) 50 MG tablet TAKE 1 TABLET BY MOUTH EVERY EVENING.    vit A/vit C/vit E/zinc/copper (PRESERVISION AREDS ORAL) Take 2 tablets by mouth Daily.    vitamin D (VITAMIN D3) 1000 units Tab Take 1,000 Units by mouth once daily.    acetaminophen (TYLENOL) 325 MG tablet Take 2 tablets (650 mg total) by mouth every 6 (six) hours as needed for Pain. (Patient not taking: Reported on 10/25/2024)    diphenhydrAMINE (BENADRYL) 50 MG capsule Take 50mg by mouth 1 hour before contrast administration. (Patient not taking: Reported on 11/21/2024)    polyethylene glycol (GLYCOLAX) 17 gram/dose powder Mix 17g in water daily, if not effective may increase to bid. (Patient not taking: Reported on 11/21/2024)    predniSONE (DELTASONE) 10 MG tablet Take 1 tablet (10 mg total) by mouth once daily. (Patient not taking: Reported on  11/21/2024)    tamsulosin (FLOMAX) 0.4 mg Cap Take 1 capsule (0.4 mg total) by mouth once daily. 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    Atrioventricular block     Bleeding ulcer     Blood pressure elevated without history of HTN     BPH (benign prostatic hyperplasia)     Bradycardia     35-60's    Calculus of gallbladder without cholecystitis without obstruction 05/22/2023    Candidiasis of skin and nails     CAP (community acquired pneumonia)     Carbuncle of back (any part, except buttock)     Cellulitis and abscess of trunk     Cellulitis of ear canal     Cervical pain (neck)     Colon polyp     Diverticulitis     Encounter for blood transfusion 2021    X3    GIB (gastrointestinal bleeding) 12/30/2021    Hematochezia     History of shortness of breath     Hypertension     IFG (impaired fasting glucose)     Iron deficiency anemia     Kidney stone     Lightheaded     Lung mass 12/09/2021    Malaise and fatigue     Obesity, unspecified     Otalgia     Palpitations     Paroxysmal supraventricular tachycardia     Pleural effusion     PND (post-nasal drip)     Skin cancer 02/2022    RIGHT FACE    Thyroid disease     Type 2 diabetes mellitus without complication, without long-term current use of insulin 1/31/2024    Unspecified cataract 02/2023    Unspecified hemorrhoids      Past Surgical History:   Procedure Laterality Date    ANGIOGRAM, CORONARY, WITH LEFT HEART CATHETERIZATION  09/01/2022    CIS    COLONOSCOPY      ESOPHAGOGASTRODUODENOSCOPY N/A 02/24/2022    Procedure: EGD (ESOPHAGOGASTRODUODENOSCOPY);  Surgeon: Duane Cardenas MD;   Location: [facility] ENDO;  Service: General;  Laterality: N/A;  #1 0600    INSERTION, PACEMAKER, LEADLESS N/A 5/6/2024    Procedure: INSERTION, PACEMAKER, LEADLESS;  Surgeon: tK Benoit MD;  Location: [facility] CATH;  Service: Cardiology;  Laterality: N/A;    PHACOEMULSIFICATION, CATARACT, WITH IOL INSERTION Right 2/28/2023    Procedure: PHACOEMULSIFICATION, CATARACT, WITH IOL INSERTION;  Surgeon: Maldonado Garrison MD;  Location: [facility] OR;  Service: Ophthalmology;  Laterality: Right;  5th - 0800  PHACO/IOL    PHACOEMULSIFICATION, CATARACT, WITH IOL INSERTION Left 3/28/2023    Procedure: PHACOEMULSIFICATION, CATARACT, WITH IOL INSERTION;  Surgeon: Maldonado Garrison MD;  Location: [facility] OR;  Service: Ophthalmology;  Laterality: Left;  PHACO/IOL  3rd 0700    SKIN CANCER EXCISION Right 2022    RIGHT FACE    TOTAL KNEE ARTHROPLASTY Bilateral 2001     Family History       Problem Relation (Age of Onset)    Cancer Mother (83), Father    Heart attack Father (69)    Heart disease Sister (90), Sister (88)          Tobacco Use    Smoking status: Never     Passive exposure: Never    Smokeless tobacco: Never   Substance and Sexual Activity    Alcohol use: Yes     Comment: OCC    Drug use: Never    Sexual activity: Not on file     Review of Systems   Constitutional:  Negative for activity change, appetite change, chills and fever.   Respiratory:  Negative for chest tightness and shortness of breath.    Cardiovascular:  Negative for chest pain.   Gastrointestinal:  Negative for abdominal distention, abdominal pain, anal bleeding, blood in stool, constipation, diarrhea, nausea, rectal pain and vomiting.     Objective:     Vital Signs (Most Recent):  Temp: 97.7 °F (36.5 °C) (11/21/24 1906)  Pulse: 66 (11/21/24 1906)  Resp: 20 (11/21/24 1906)  BP: 139/63 (11/21/24 1906)  SpO2: 96 % (11/21/24 1906) Vital Signs (24h Range):  Temp:  [97.7 °F (36.5 °C)] 97.7 °F (36.5 °C)  Pulse:  [66-68] 66  Resp:  [18-20] 20  SpO2:  [96 %-97 %] 96 %  BP:  (139-154)/(63-67) 139/63     Weight: 108.6 kg (239 lb 8 oz)  Body mass index is 34.36 kg/m².     Physical Exam  Vitals reviewed.   Constitutional:       General: He is not in acute distress.     Appearance: He is not ill-appearing or toxic-appearing.   Cardiovascular:      Rate and Rhythm: Normal rate and regular rhythm.   Pulmonary:      Effort: Pulmonary effort is normal. No respiratory distress.   Abdominal:      General: There is no distension.      Palpations: Abdomen is soft.      Tenderness: There is no abdominal tenderness. There is no guarding or rebound.   Musculoskeletal:      Right lower leg: No edema.      Left lower leg: No edema.   Skin:     General: Skin is warm and dry.      Capillary Refill: Capillary refill takes less than 2 seconds.      Comments: 7.0 x 7.0 cm furuncle with fluctuance and blanching erythema    Neurological:      Mental Status: He is alert. Mental status is at baseline.            I have reviewed all pertinent lab results within the past 24 hours.  CBC:   Recent Labs   Lab 11/21/24 1956   WBC 5.83   RBC 4.21*   HGB 12.9*   HCT 39.3*      MCV 93   MCH 30.6   MCHC 32.8     CMP:   Recent Labs   Lab 11/21/24 1956   GLU 98   CALCIUM 8.4*   ALBUMIN 2.8*   PROT 6.5      K 3.9   CO2 34*      BUN 23   CREATININE 1.6*   ALKPHOS 155*   ALT 14   AST 12   BILITOT 0.3       Significant Diagnostics:  I have reviewed all pertinent imaging results/findings within the past 24 hours.

## 2024-11-22 NOTE — HPI
88yo male who presents with infected furuncle of the back with abscess.   Presented to  clinic where superficial I&D of most fluctuant area was performed.  Cultures obtained.  Recommended direct admit at that time for IV abx and more adequate incision and drainage under sedation.

## 2024-11-22 NOTE — ASSESSMENT & PLAN NOTE
Direct admit from  clinic   Follow up OP cultures   NPO midnight   To OR tomorrow for I&D of back furuncle   IV Zosyn   IVFs @ 100cc/hr   Restart home meds

## 2024-11-22 NOTE — PLAN OF CARE
NPO awaiting I&D in AM.     Problem: Adult Inpatient Plan of Care  Goal: Plan of Care Review  Outcome: Progressing  Goal: Patient-Specific Goal (Individualized)  Outcome: Progressing  Goal: Absence of Hospital-Acquired Illness or Injury  Outcome: Progressing  Goal: Optimal Comfort and Wellbeing  Outcome: Progressing  Goal: Readiness for Transition of Care  Outcome: Progressing     Problem: Diabetes Comorbidity  Goal: Blood Glucose Level Within Targeted Range  Outcome: Progressing     Problem: Wound  Goal: Optimal Coping  Outcome: Progressing  Goal: Optimal Functional Ability  Outcome: Progressing  Goal: Absence of Infection Signs and Symptoms  Outcome: Progressing  Goal: Improved Oral Intake  Outcome: Progressing  Goal: Optimal Pain Control and Function  Outcome: Progressing  Goal: Skin Health and Integrity  Outcome: Progressing  Goal: Optimal Wound Healing  Outcome: Progressing     Problem: Comorbidity Management  Goal: Blood Pressure in Desired Range  Outcome: Progressing

## 2024-11-22 NOTE — PLAN OF CARE
11/22/24 0834   VALLECILLO Message   Medicare Outpatient and Observation Notification regarding financial responsibility Given to patient/caregiver;Explained to patient/caregiver;Signed/date by patient/caregiver   Date VALLECILLO was signed 11/22/24   Time VALLECILLO was signed 0828

## 2024-11-22 NOTE — NURSING
Pt arrived to room 644 as a direct admit of Dr. Sharifa Barrientos. Pt is ambulatory. No distress noted. Pt oriented to room and assessed, orders acknowledged, reviewed POC with pt, needs and concerns addressed. Will continue to monitor.

## 2024-11-22 NOTE — H&P
[facility]  General Surgery  History & Physical    Patient Name: Geraldo MccallumDr.  MRN: [id number]  Admission Date: 11/21/2024  Attending Physician: Sharifa Barrientos MD   Primary Care Provider: Stefan Fleming III, MD    Patient information was obtained from patient and ER records.     Subjective:     Chief Complaint/Reason for Admission: furuncle of back with cellulitis and abscess    History of Present Illness: 89yo male who presents with infected furuncle of the back with abscess.   Presented to  clinic where superficial I&D of most fluctuant area was performed.  Cultures obtained.  Recommended direct admit at that time for IV abx and more adequate incision and drainage under sedation.     Current Facility-Administered Medications on File Prior to Encounter   Medication    phenylephrine-ketorolac (OMIDRIA) 1-0.3 % 500 mL irrigation    phenylephrine-ketorolac (OMIDRIA) 1-0.3 % 500 mL irrigation     Current Outpatient Medications on File Prior to Encounter   Medication Sig    albuterol (VENTOLIN HFA) 90 mcg/actuation inhaler Inhale 2 puffs into the lungs every 6 (six) hours as needed for Wheezing. Rescue    aspirin (ECOTRIN) 81 MG EC tablet 1 tablet.    carvediloL (COREG) 3.125 MG tablet Take 1 tablet (3.125 mg total) by mouth 2 (two) times daily. (Patient taking differently: Take 3.125 mg by mouth once daily.)    furosemide (LASIX) 20 MG tablet TAKE 1 TABLET BY MOUTH TWICE A DAY    levocetirizine (XYZAL) 5 MG tablet TAKE 1 TABLET BY MOUTH EVERY DAY IN THE EVENING    pantoprazole (PROTONIX) 40 MG tablet TAKE ONE TABLET BY MOUTH EVERY DAY    rosuvastatin (CRESTOR) 5 MG tablet TAKE 1 TABLET ORALLY ONCE A DAY AT NIGHT.    SYNTHROID 50 mcg tablet TAKE 1 TABLET BY MOUTH EVERY DAY    timolol maleate 0.5% (TIMOPTIC) 0.5 % Drop     travoprost (TRAVATAN Z) 0.004 % ophthalmic solution     traZODone (DESYREL) 50 MG tablet TAKE 1 TABLET BY MOUTH EVERY EVENING.    vit A/vit C/vit E/zinc/copper (PRESERVISION  AREDS ORAL) Take 2 tablets by mouth Daily.    vitamin D (VITAMIN D3) 1000 units Tab Take 1,000 Units by mouth once daily.    acetaminophen (TYLENOL) 325 MG tablet Take 2 tablets (650 mg total) by mouth every 6 (six) hours as needed for Pain. (Patient not taking: Reported on 10/25/2024)    diphenhydrAMINE (BENADRYL) 50 MG capsule Take 50mg by mouth 1 hour before contrast administration. (Patient not taking: Reported on 11/21/2024)    polyethylene glycol (GLYCOLAX) 17 gram/dose powder Mix 17g in water daily, if not effective may increase to bid. (Patient not taking: Reported on 11/21/2024)    predniSONE (DELTASONE) 10 MG tablet Take 1 tablet (10 mg total) by mouth once daily. (Patient not taking: Reported on 11/21/2024)    tamsulosin (FLOMAX) 0.4 mg Cap Take 1 capsule (0.4 mg total) by mouth once daily. 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    Atrioventricular block     Bleeding ulcer     Blood pressure elevated without history of HTN     BPH (benign prostatic hyperplasia)     Bradycardia     35-60's    Calculus of gallbladder without cholecystitis without obstruction 05/22/2023    Candidiasis of skin and nails     CAP (community acquired pneumonia)     Carbuncle of back (any part, except buttock)     Cellulitis and abscess of trunk     Cellulitis of ear canal     Cervical pain (neck)     Colon polyp     Diverticulitis     Encounter for blood transfusion 2021    X3    GIB (gastrointestinal bleeding) 12/30/2021    Hematochezia     History of shortness of breath     Hypertension     IFG (impaired fasting glucose)     Iron deficiency  anemia     Kidney stone     Lightheaded     Lung mass 12/09/2021    Malaise and fatigue     Obesity, unspecified     Otalgia     Palpitations     Paroxysmal supraventricular tachycardia     Pleural effusion     PND (post-nasal drip)     Skin cancer 02/2022    RIGHT FACE    Thyroid disease     Type 2 diabetes mellitus without complication, without long-term current use of insulin 1/31/2024    Unspecified cataract 02/2023    Unspecified hemorrhoids      Past Surgical History:   Procedure Laterality Date    ANGIOGRAM, CORONARY, WITH LEFT HEART CATHETERIZATION  09/01/2022    CIS    COLONOSCOPY      ESOPHAGOGASTRODUODENOSCOPY N/A 02/24/2022    Procedure: EGD (ESOPHAGOGASTRODUODENOSCOPY);  Surgeon: Duane Cardenas MD;  Location: [facility];  Service: General;  Laterality: N/A;  #1 0600    INSERTION, PACEMAKER, LEADLESS N/A 5/6/2024    Procedure: INSERTION, PACEMAKER, LEADLESS;  Surgeon: Kt Benoit MD;  Location: [facility] CATH;  Service: Cardiology;  Laterality: N/A;    PHACOEMULSIFICATION, CATARACT, WITH IOL INSERTION Right 2/28/2023    Procedure: PHACOEMULSIFICATION, CATARACT, WITH IOL INSERTION;  Surgeon: Maldonado Garrison MD;  Location: [facility];  Service: Ophthalmology;  Laterality: Right;  5th - 0800  PHACO/IOL    PHACOEMULSIFICATION, CATARACT, WITH IOL INSERTION Left 3/28/2023    Procedure: PHACOEMULSIFICATION, CATARACT, WITH IOL INSERTION;  Surgeon: Maldonado Garrison MD;  Location: [facility];  Service: Ophthalmology;  Laterality: Left;  PHACO/IOL  3rd 0700    SKIN CANCER EXCISION Right 2022    RIGHT FACE    TOTAL KNEE ARTHROPLASTY Bilateral 2001     Family History       Problem Relation (Age of Onset)    Cancer Mother (83), Father    Heart attack Father (69)    Heart disease Sister (90), Sister (88)          Tobacco Use    Smoking status: Never     Passive exposure: Never    Smokeless tobacco: Never   Substance and Sexual Activity    Alcohol use: Yes     Comment: OCC    Drug use: Never    Sexual activity: Not on  file     Review of Systems   Constitutional:  Negative for activity change, appetite change, chills and fever.   Respiratory:  Negative for chest tightness and shortness of breath.    Cardiovascular:  Negative for chest pain.   Gastrointestinal:  Negative for abdominal distention, abdominal pain, anal bleeding, blood in stool, constipation, diarrhea, nausea, rectal pain and vomiting.     Objective:     Vital Signs (Most Recent):  Temp: 97.7 °F (36.5 °C) (11/21/24 1906)  Pulse: 66 (11/21/24 1906)  Resp: 20 (11/21/24 1906)  BP: 139/63 (11/21/24 1906)  SpO2: 96 % (11/21/24 1906) Vital Signs (24h Range):  Temp:  [97.7 °F (36.5 °C)] 97.7 °F (36.5 °C)  Pulse:  [66-68] 66  Resp:  [18-20] 20  SpO2:  [96 %-97 %] 96 %  BP: (139-154)/(63-67) 139/63     Weight: 108.6 kg (239 lb 8 oz)  Body mass index is 34.36 kg/m².     Physical Exam  Vitals reviewed.   Constitutional:       General: He is not in acute distress.     Appearance: He is not ill-appearing or toxic-appearing.   Cardiovascular:      Rate and Rhythm: Normal rate and regular rhythm.   Pulmonary:      Effort: Pulmonary effort is normal. No respiratory distress.   Abdominal:      General: There is no distension.      Palpations: Abdomen is soft.      Tenderness: There is no abdominal tenderness. There is no guarding or rebound.   Musculoskeletal:      Right lower leg: No edema.      Left lower leg: No edema.   Skin:     General: Skin is warm and dry.      Capillary Refill: Capillary refill takes less than 2 seconds.      Comments: 7.0 x 7.0 cm furuncle with fluctuance and blanching erythema    Neurological:      Mental Status: He is alert. Mental status is at baseline.            I have reviewed all pertinent lab results within the past 24 hours.  CBC:   Recent Labs   Lab 11/21/24 1956   WBC 5.83   RBC 4.21*   HGB 12.9*   HCT 39.3*      MCV 93   MCH 30.6   MCHC 32.8     CMP:   Recent Labs   Lab 11/21/24 1956   GLU 98   CALCIUM 8.4*   ALBUMIN 2.8*   PROT 6.5   NA  140   K 3.9   CO2 34*      BUN 23   CREATININE 1.6*   ALKPHOS 155*   ALT 14   AST 12   BILITOT 0.3       Significant Diagnostics:  I have reviewed all pertinent imaging results/findings within the past 24 hours.    Assessment/Plan:     Furuncle of back, except buttock  Direct admit from  clinic   Follow up OP cultures   NPO midnight   To OR tomorrow for I&D of back furuncle   IV Zosyn   IVFs @ 100cc/hr   Restart home meds     Type 2 diabetes mellitus without complication, without long-term current use of insulin  SSI  POCT glucose      VTE Risk Mitigation (From admission, onward)           Ordered     IP VTE LOW RISK PATIENT  Once         11/21/24 1934     Place sequential compression device  Until discontinued         11/21/24 1934                    Sharifa Barrientos MD  General Surgery  [facility]

## 2024-11-22 NOTE — OP NOTE
"[facility]   General Surgery Department  Procedure Note    SUMMARY     Date of Procedure: 11/22/2024    Procedure: Procedure(s) (LRB):  Incision and Drainage of ruptured cyst with abscess of the back    Surgeon(s) and Role:  Sharifa Barrientos MD     Pre-Operative Diagnosis: Abscess of the back    Post-Operative Diagnosis: Same         Anesthesia: Local    Findings:  -6.0 x 6.0 cm ruptured cyst cavity with purulence and debris  -Irrigation and drainage with 1/4" penrose x 2 left in place     Indications for the Procedure:  88yo male who presents with abscess of the back from ruptured epithelial inclusion cyst.    Operative Conduct in Detail:   Patient was seen in holding where all questions concerns were addressed.  He was then wheeled to the operating room and placed in the left lateral position and monitored anesthesia care was administered.  The abscess located over the patient's right scapula was prepped and draped in sterile fashion.  He was on around the clock antibiotics on the floor.  A time-out was performed in the operating room with all present and in agreement.    The area was infiltrated with local anesthesia.    The 6.0 x 6 0 cavity was filled with cystic debris as well as purulent material.  The wound had two openings on the most lateral and medial apices.  Devitalized tissue around the openings were sharply debrided with cut electrocautery.  Cavity was copiously irrigated with normal saline.  Two 1/4 inch Penrose drains were looped through the cavity and secured with a 2-0 nylon suture.  After ensuring hemostasis, the cavity was packed with saline soaked Kerlix gauze and covered with sterile dressing.  The patient was then aroused from sedation and taken to PACU in stable condition.  All lap and instrument counts were correct x2 prior to and after wound coverage.    Complications: No    Estimated Blood Loss (EBL): 5           Specimens:   * No specimens in log *           Condition: " Good    Disposition: PACU - hemodynamically stable.      Sharifa Barrientos MD  General Surgery   [phone]

## 2024-11-22 NOTE — HOSPITAL COURSE
Cultures obtained. IV zosyn 4.5g administered. Consulted for chronic condition mgmt. GS plans for sx placement of penrose drain later this AM. Then d/c back home with [facility] for W.C and therapy eval/tx.

## 2024-11-22 NOTE — ASSESSMENT & PLAN NOTE
"Patient's FSGs are controlled on current medication regimen.  Last A1c reviewed-   Lab Results   Component Value Date    HGBA1C 5.9 (H) 11/21/2024     Most recent fingerstick glucose reviewed- No results for input(s): "POCTGLUCOSE" in the last 24 hours.  Current correctional scale  Low  Maintain anti-hyperglycemic dose as follows-   Antihyperglycemics (From admission, onward)      Start     Stop Route Frequency Ordered    11/21/24 2347  insulin aspart U-100 pen 0-10 Units         -- SubQ Every 6 hours PRN 11/21/24 2247          Hold Oral hypoglycemics while patient is in the hospital.  "

## 2024-11-22 NOTE — TRANSFER OF CARE
Anesthesia Transfer of Care Note    Patient: Geraldo Mccallum Dr.    Procedure(s) Performed: Procedure(s) (LRB):  Incision and Drainage back abscess (N/A)    Patient location: PACU    Anesthesia Type: MAC    Transport from OR: Transported from OR on room air with adequate spontaneous ventilation    Post pain: adequate analgesia    Post assessment: no apparent anesthetic complications    Post vital signs: stable    Level of consciousness: awake, alert and oriented    Nausea/Vomiting: no nausea/vomiting    Complications: none    Transfer of care protocol was followed      Last vitals:     /54  P 67  R 14  O2 Sat 100%

## 2024-11-22 NOTE — ASSESSMENT & PLAN NOTE
Patients blood pressure range in the last 24 hours was: BP  Min: 118/67  Max: 140/85.The patient's inpatient anti-hypertensive regimen is listed below:  Current Antihypertensives  carvediloL tablet 3.125 mg, Daily, Oral  furosemide tablet 20 mg, 2 times daily, Oral    Plan  - BP is controlled, no changes needed to their regimen  - cont. Current regimen

## 2024-11-22 NOTE — PLAN OF CARE
11/22/24 1300   Post-Acute Status   Post-Acute Authorization Home Health   Home Health Status Referrals Sent   Coverage MEDICARE - MEDICARE PART A & B   Discharge Delays None known at this time   Discharge Plan   Discharge Plan A Home with family;Home Health   Discharge Plan B Home with family;Home Health     [facility] currently has this patient's referral under review. Camryn white [facility] is aware of the referral.     [facility] is willing to accept this patient for home health services.

## 2024-11-22 NOTE — PLAN OF CARE
[facility]  Initial Discharge Assessment       Primary Care Provider: Stefan Fleming III, MD    Admission Diagnosis: Furuncle of back, except buttock [L02.222]    Admission Date: 11/21/2024  Expected Discharge Date:     Transition of Care Barriers: None    Payor: MEDICARE / Plan: MEDICARE PART A & B / Product Type: Government /     Extended Emergency Contact Information  Primary Emergency Contact: YEN BRICEÑO  Mobile Phone: [phone]  Relation: Spouse  Preferred language: English  Secondary Emergency Contact: Ifrah Paul  Mobile Phone: [phone]  Relation: Daughter  Preferred language: English   needed? No    Discharge Plan A: Home with family, Home Health  Discharge Plan B: Home with family, Home Health      [id number] - [place], [place] - [address] [address]  [address] [address]  Phone: [phone] Fax: [phone]      Initial Assessment (most recent)       Adult Discharge Assessment - 11/22/24 0905          Discharge Assessment    Assessment Type Discharge Planning Assessment     Confirmed/corrected address, phone number and insurance Yes     Confirmed Demographics Correct on Facesheet     Source of Information patient     When was your last doctors appointment? 11/21/24     People in Home spouse     Do you expect to return to your current living situation? Yes     Prior to hospitilization cognitive status: Alert/Oriented     Current cognitive status: Alert/Oriented     Walking or Climbing Stairs Difficulty no   The patient expressed that he does have a cane if needed.    Dressing/Bathing Difficulty no   The patient stated that he does have a shower chair, but he does not use it.    Home Accessibility stairs to enter home;stairs within home     Stairs, Within Home, Primary --   The patient reports that his bedroom is on the lower level.    Number of Stairs, Main Entrance one     Home Layout Able to live on 1st floor     Equipment Currently Used at Home wound care  supplies;cane, straight     Readmission within 30 days? No     Patient currently being followed by outpatient case management? Unable to determine (comments)     Do you currently have service(s) that help you manage your care at home? No     Do you take prescription medications? Yes     Do you have prescription coverage? Yes     Coverage [employer]     Do you have any problems affording any of your prescribed medications? TBD     Is the patient taking medications as prescribed? yes     How do you get to doctors appointments? car, drives self     Are you on dialysis? No     Do you take coumadin? No     Discharge Plan A Home with family;Home Health     Discharge Plan B Home with family;Home Health     DME Needed Upon Discharge  other (see comments)   TBD    Discharge Plan discussed with: Patient     Transition of Care Barriers None        Physical Activity    On average, how many days per week do you engage in moderate to strenuous exercise (like a brisk walk)? 3 days     On average, how many minutes do you engage in exercise at this level? 60 min        Financial Resource Strain    How hard is it for you to pay for the very basics like food, housing, medical care, and heating? Not hard at all        Housing Stability    In the last 12 months, was there a time when you were not able to pay the mortgage or rent on time? No     At any time in the past 12 months, were you homeless or living in a shelter (including now)? No        Stress    Do you feel stress - tense, restless, nervous, or anxious, or unable to sleep at night because your mind is troubled all the time - these days? Not at all        Social Isolation    How often do you feel lonely or isolated from those around you?  Never        Alcohol Use    Q1: How often do you have a drink containing alcohol? Monthly or less     Q2: How many drinks containing alcohol do you have on a typical day when you are drinking? 1 or 2     Q3: How often do you have six or  more drinks on one occasion? Never        Utilities    In the past 12 months has the electric, gas, oil, or water company threatened to shut off services in your home? No        Health Literacy    How often do you need to have someone help you when you read instructions, pamphlets, or other written material from your doctor or pharmacy? Rarely                 Initial discharge assessment is completed. Spoke to the patient in his room. He was awake, alert, and oriented to the questions being asked. The patient is from home. He plans to return home upon discharge. The patient expressed that he does have a cane if needed. He is open to home health care upon discharge. The patient expressed that he does not have a preference.     The patient politely declined community resources at this time. Discharge planning checklist given to patient with instructions to contact  for any needs.  SW will follow as needed.

## 2024-11-22 NOTE — CONSULTS
[facility] Medicine  Consult Note    Patient Name: Geraldo Cardoso Dr. Alessio  MRN: [id number]  Admission Date: 11/21/2024  Hospital Length of Stay: 0 days  Attending Physician: Sharifa Barrientos MD   Primary Care Provider: Stefan Fleming III, MD           Patient information was obtained from patient, past medical records, and family.      Consults  Subjective:     Principal Problem: <principal problem not specified>    Chief Complaint: No chief complaint on file.       HPI: 88yo male who presented to  with infected furuncle of the back with abscess, and superficial I&D performed. Cultures obtained and orders placed for direct admission for more adequate incision and drainage under sedation. Consulted for recs r/t chronic condition mgmt. Pt. Receiving IV ABX and plans for sx placement penrose drain later this AM. Then d/c back home with [facility] for W.C and therapy eval/tx.      Past Medical History:   Diagnosis Date    Acute blood loss anemia 12/30/2021    Acute cough     Acute gastric ulcer with hemorrhage 12/30/2021    Acute prostatitis     Acute superficial gastritis without hemorrhage 02/24/2022    ETHAN (acute kidney injury) 12/30/2021    Arthritis of finger of left hand 05/22/2023    Atrioventricular block     Bleeding ulcer     Blood pressure elevated without history of HTN     BPH (benign prostatic hyperplasia)     Bradycardia     35-60's    Calculus of gallbladder without cholecystitis without obstruction 05/22/2023    Candidiasis of skin and nails     CAP (community acquired pneumonia)     Carbuncle of back (any part, except buttock)     Cellulitis and abscess of trunk     Cellulitis of ear canal     Cervical pain (neck)     Colon polyp     Diverticulitis     Encounter for blood transfusion 2021    X3    GIB (gastrointestinal bleeding) 12/30/2021    Hematochezia     History of shortness of breath     Hypertension     IFG (impaired fasting glucose)     Iron deficiency anemia     Kidney stone      Lightheaded     Lung mass 12/09/2021    Malaise and fatigue     Obesity, unspecified     Otalgia     Palpitations     Paroxysmal supraventricular tachycardia     Pleural effusion     PND (post-nasal drip)     Skin cancer 02/2022    RIGHT FACE    Thyroid disease     Type 2 diabetes mellitus without complication, without long-term current use of insulin 1/31/2024    Unspecified cataract 02/2023    Unspecified hemorrhoids        Past Surgical History:   Procedure Laterality Date    ANGIOGRAM, CORONARY, WITH LEFT HEART CATHETERIZATION  09/01/2022    CIS    COLONOSCOPY      ESOPHAGOGASTRODUODENOSCOPY N/A 02/24/2022    Procedure: EGD (ESOPHAGOGASTRODUODENOSCOPY);  Surgeon: Duane Cardenas MD;  Location: [facility];  Service: General;  Laterality: N/A;  #1 0600    INSERTION, PACEMAKER, LEADLESS N/A 5/6/2024    Procedure: INSERTION, PACEMAKER, LEADLESS;  Surgeon: Kt Benoit MD;  Location: [facility] C[facility];  Service: Cardiology;  Laterality: N/A;    PHACOEMULSIFICATION, CATARACT, WITH IOL INSERTION Right 2/28/2023    Procedure: PHACOEMULSIFICATION, CATARACT, WITH IOL INSERTION;  Surgeon: Maldonado Garrison MD;  Location: [facility];  Service: Ophthalmology;  Laterality: Right;  5th - 0800  PHACO/IOL    PHACOEMULSIFICATION, CATARACT, WITH IOL INSERTION Left 3/28/2023    Procedure: PHACOEMULSIFICATION, CATARACT, WITH IOL INSERTION;  Surgeon: Maldonado Garrison MD;  Location: [facility];  Service: Ophthalmology;  Laterality: Left;  PHACO/IOL  3rd 0700    SKIN CANCER EXCISION Right 2022    RIGHT FACE    TOTAL KNEE ARTHROPLASTY Bilateral 2001       Review of patient's allergies indicates:  No Known Allergies    Current Facility-Administered Medications on File Prior to Encounter   Medication    phenylephrine-ketorolac (OMIDRIA) 1-0.3 % 500 mL irrigation    phenylephrine-ketorolac (OMIDRIA) 1-0.3 % 500 mL irrigation     Current Outpatient Medications on File Prior to Encounter   Medication Sig    albuterol (VENTOLIN HFA) 90  mcg/actuation inhaler Inhale 2 puffs into the lungs every 6 (six) hours as needed for Wheezing. Rescue    aspirin (ECOTRIN) 81 MG EC tablet 1 tablet.    carvediloL (COREG) 3.125 MG tablet Take 1 tablet (3.125 mg total) by mouth 2 (two) times daily. (Patient taking differently: Take 3.125 mg by mouth once daily.)    furosemide (LASIX) 20 MG tablet TAKE 1 TABLET BY MOUTH TWICE A DAY    levocetirizine (XYZAL) 5 MG tablet TAKE 1 TABLET BY MOUTH EVERY DAY IN THE EVENING    pantoprazole (PROTONIX) 40 MG tablet TAKE ONE TABLET BY MOUTH EVERY DAY    rosuvastatin (CRESTOR) 5 MG tablet TAKE 1 TABLET ORALLY ONCE A DAY AT NIGHT.    SYNTHROID 50 mcg tablet TAKE 1 TABLET BY MOUTH EVERY DAY    timolol maleate 0.5% (TIMOPTIC) 0.5 % Drop     travoprost (TRAVATAN Z) 0.004 % ophthalmic solution     traZODone (DESYREL) 50 MG tablet TAKE 1 TABLET BY MOUTH EVERY EVENING.    vit A/vit C/vit E/zinc/copper (PRESERVISION AREDS ORAL) Take 2 tablets by mouth Daily.    vitamin D (VITAMIN D3) 1000 units Tab Take 1,000 Units by mouth once daily.    acetaminophen (TYLENOL) 325 MG tablet Take 2 tablets (650 mg total) by mouth every 6 (six) hours as needed for Pain. (Patient not taking: Reported on 10/25/2024)    diphenhydrAMINE (BENADRYL) 50 MG capsule Take 50mg by mouth 1 hour before contrast administration. (Patient not taking: Reported on 11/21/2024)    polyethylene glycol (GLYCOLAX) 17 gram/dose powder Mix 17g in water daily, if not effective may increase to bid. (Patient not taking: Reported on 11/21/2024)    predniSONE (DELTASONE) 10 MG tablet Take 1 tablet (10 mg total) by mouth once daily. (Patient not taking: Reported on 11/21/2024)    tamsulosin (FLOMAX) 0.4 mg Cap Take 1 capsule (0.4 mg total) by mouth once daily. (Patient not taking: Reported on 11/21/2024)     Family History       Problem Relation (Age of Onset)    Cancer Mother (83), Father    Heart attack Father (69)    Heart disease Sister (90), Sister (88)          Tobacco Use     Smoking status: Never     Passive exposure: Never    Smokeless tobacco: Never   Substance and Sexual Activity    Alcohol use: Yes     Comment: OCC    Drug use: Never    Sexual activity: Not on file     Review of Systems   Constitutional:  Positive for activity change. Negative for chills and fever.   Respiratory:  Negative for apnea, cough, chest tightness, shortness of breath and wheezing.    Cardiovascular:  Negative for chest pain and leg swelling.   Gastrointestinal:  Negative for abdominal distention, abdominal pain, constipation, diarrhea, nausea and vomiting.   Genitourinary:  Negative for difficulty urinating and dysuria.   Skin:  Positive for wound.   Neurological:  Negative for dizziness, light-headedness and headaches.   Psychiatric/Behavioral:  Negative for agitation.      Objective:     Vital Signs (Most Recent):  Temp: 97.6 °F (36.4 °C) (11/22/24 0728)  Pulse: 60 (11/22/24 0728)  Resp: 18 (11/22/24 0728)  BP: (!) 140/85 (11/22/24 0728)  SpO2: (!) 94 % (11/22/24 0728) Vital Signs (24h Range):  Temp:  [97.6 °F (36.4 °C)-97.7 °F (36.5 °C)] 97.6 °F (36.4 °C)  Pulse:  [60-76] 60  Resp:  [18-20] 18  SpO2:  [94 %-96 %] 94 %  BP: (118-140)/(61-85) 140/85     Weight: 108.6 kg (239 lb 8 oz)  Body mass index is 34.36 kg/m².     Physical Exam  Constitutional:       Appearance: Normal appearance. He is obese. He is not ill-appearing.   HENT:      Head: Normocephalic and atraumatic.      Nose: Nose normal.   Eyes:      Extraocular Movements: Extraocular movements intact.      Pupils: Pupils are equal, round, and reactive to light.   Cardiovascular:      Rate and Rhythm: Normal rate and regular rhythm.   Pulmonary:      Effort: Pulmonary effort is normal.      Breath sounds: Normal breath sounds.   Abdominal:      General: Abdomen is flat. Bowel sounds are normal. There is no distension.      Palpations: Abdomen is soft.   Musculoskeletal:      Cervical back: Normal range of motion and neck supple.      Right lower  leg: No edema.      Left lower leg: No edema.   Skin:     General: Skin is warm.             Comments: Midback-wound s/p Sx I&D; covered with gauze drg.    Neurological:      Mental Status: He is alert.          Significant Labs: All pertinent labs within the past 24 hours have been reviewed.  Recent Lab Results         11/21/24 1956 11/21/24  0957        Albumin 2.8                  ALT 14         Anion Gap 3         AST 12         Baso # 0.02         Basophil % 0.3         BILIRUBIN TOTAL 0.3  Comment: For infants and newborns, interpretation of results should be based  on gestational age, weight and in agreement with clinical  observations.    Premature Infant recommended reference ranges:  Up to 24 hours.............<8.0 mg/dL  Up to 48 hours............<12.0 mg/dL  3-5 days..................<15.0 mg/dL  6-29 days.................<15.0 mg/dL    For patients on Eltrombopag therapy, use of Dimension [place] TBIL is   not   recommended.           BUN 23         Calcium 8.4         Chloride 103         CO2 34         Creatinine 1.6         Differential Method Automated         eGFR 41.2         Eos # 0.3         Eos % 4.8         Estimated Avg Glucose 123         Glucose 98         GRAM STAIN   Moderate WBC's          Many Gram positive cocci       Gran # (ANC) 3.9         Gran % 66.2         Hematocrit 39.3         Hemoglobin 12.9         Hemoglobin A1C External 5.9  Comment: ADA Screening Guidelines:  5.7-6.4%  Consistent with prediabetes  >or=6.5%  Consistent with diabetes    High levels of fetal hemoglobin interfere with the HbA1C  assay. Heterozygous hemoglobin variants (HbS, HgC, etc)do  not significantly interfere with this assay.   However, presence of multiple variants may affect accuracy.           Immature Grans (Abs) 0.01  Comment: Mild elevation in immature granulocytes is non specific and   can be seen in a variety of conditions including stress response,   acute inflammation, trauma and  "pregnancy. Correlation with other   laboratory and clinical findings is essential.           Immature Granulocytes 0.2         Lymph # 1.1         Lymph % 18.0         Magnesium  1.7         MCH 30.6         MCHC 32.8         MCV 93         Mono # 0.6         Mono % 10.5         MPV 10.6         nRBC 0         Phosphorus Level 3.1         Platelet Count 211         Potassium 3.9         PROTEIN TOTAL 6.5         RBC 4.21         RDW 14.7         Sodium 140         WBC 5.83                 Significant Imaging: I have reviewed all pertinent imaging results/findings within the past 24 hours.  Assessment/Plan:     Furuncle of back, except buttock  Cont. Mgmt per Dr. Barrientos    Type 2 diabetes mellitus without complication, without long-term current use of insulin  Patient's FSGs are controlled on current medication regimen.  Last A1c reviewed-   Lab Results   Component Value Date    HGBA1C 5.9 (H) 11/21/2024     Most recent fingerstick glucose reviewed- No results for input(s): "POCTGLUCOSE" in the last 24 hours.  Current correctional scale  Low  Maintain anti-hyperglycemic dose as follows-   Antihyperglycemics (From admission, onward)      Start     Stop Route Frequency Ordered    11/21/24 2347  insulin aspart U-100 pen 0-10 Units         -- SubQ Every 6 hours PRN 11/21/24 2247          Hold Oral hypoglycemics while patient is in the hospital.    Primary hypertension  Patients blood pressure range in the last 24 hours was: BP  Min: 118/67  Max: 140/85.The patient's inpatient anti-hypertensive regimen is listed below:  Current Antihypertensives  carvediloL tablet 3.125 mg, Daily, Oral  furosemide tablet 20 mg, 2 times daily, Oral    Plan  - BP is controlled, no changes needed to their regimen  - cont. Current regimen      VTE Risk Mitigation (From admission, onward)           Ordered     IP VTE LOW RISK PATIENT  Once         11/21/24 1934     Place sequential compression device  Until discontinued         11/21/24 1934 "                        Thank you for your consult. I will sign off. Please contact us if you have any additional questions.    MARK ARCE NP  Department of Hospital Medicine   [facility]

## 2024-11-22 NOTE — INTERVAL H&P NOTE
Patient seen and examined.  No interval change since the below obtained H&P  Informed consent verified and surgical site marked  NPO since midnight  All questions and concerns addressed  To OR for I&D of back abscess     Sharifa Barrientos MD  General Surgery   [phone]

## 2024-11-24 NOTE — ANESTHESIA POSTPROCEDURE EVALUATION
Anesthesia Post Evaluation    Patient: Geraldo Mccallum Dr.    Procedure(s) Performed: Procedure(s) (LRB):  Incision and Drainage back abscess (N/A)    Final Anesthesia Type: MAC      Patient location during evaluation: PACU  Patient participation: Yes- Able to Participate  Level of consciousness: awake and alert and oriented  Post-procedure vital signs: reviewed and stable  Pain management: adequate  Airway patency: patent    PONV status at discharge: No PONV  Anesthetic complications: no      Cardiovascular status: blood pressure returned to baseline  Respiratory status: spontaneous ventilation, unassisted and room air  Hydration status: euvolemic  Follow-up not needed.              Vitals Value Taken Time   /64 11/22/24 1317   Temp 36.6 °C (97.9 °F) 11/22/24 1253   Pulse 68 11/22/24 1318   Resp 14 11/22/24 1316   SpO2 96 % 11/22/24 1318   Vitals shown include unfiled device data.      Event Time   Out of Recovery 11/22/2024 13:17:24         Pain/Salena Score: No data recorded

## 2024-11-25 LAB — BACTERIA SPEC AEROBE CULT: ABNORMAL

## 2024-11-26 ENCOUNTER — PATIENT OUTREACH (OUTPATIENT)
Dept: ADMINISTRATIVE | Facility: CLINIC | Age: 89
End: 2024-11-26
Payer: MEDICARE

## 2024-11-26 LAB — BACTERIA SPEC ANAEROBE CULT: NORMAL

## 2024-11-26 NOTE — PROGRESS NOTES
C3 nurse spoke with Geraldo Mccallum Dr.  for a TCC post hospital discharge follow up call. The patient does not have a scheduled HOSFU appointment with Stefan Fleming III, MD  within 5-7 days post hospital discharge date 11/22/2024. C3 nurse was unable to schedule HOSFU appointment in [facility].  Please contact patient and schedule follow up appointment using HOSFU visit type on or before 11/29/2024.    Declined scheduling

## 2024-12-05 ENCOUNTER — OFFICE VISIT (OUTPATIENT)
Dept: SURGERY | Facility: CLINIC | Age: 89
End: 2024-12-05
Payer: MEDICARE

## 2024-12-05 VITALS
RESPIRATION RATE: 18 BRPM | HEART RATE: 58 BPM | OXYGEN SATURATION: 97 % | HEIGHT: 70 IN | BODY MASS INDEX: 34.5 KG/M2 | WEIGHT: 241 LBS | DIASTOLIC BLOOD PRESSURE: 56 MMHG | SYSTOLIC BLOOD PRESSURE: 114 MMHG

## 2024-12-05 DIAGNOSIS — L02.212 ABSCESS OF BACK: Primary | ICD-10-CM

## 2024-12-05 PROCEDURE — 99024 POSTOP FOLLOW-UP VISIT: CPT | Mod: POP,,, | Performed by: STUDENT IN AN ORGANIZED HEALTH CARE EDUCATION/TRAINING PROGRAM

## 2024-12-05 PROCEDURE — 99999 PR PBB SHADOW E&M-EST. PATIENT-LVL III: CPT | Mod: PBBFAC,,, | Performed by: STUDENT IN AN ORGANIZED HEALTH CARE EDUCATION/TRAINING PROGRAM

## 2024-12-05 PROCEDURE — 99213 OFFICE O/P EST LOW 20 MIN: CPT | Mod: PBBFAC | Performed by: STUDENT IN AN ORGANIZED HEALTH CARE EDUCATION/TRAINING PROGRAM

## 2024-12-09 NOTE — H&P
[facility]  General Surgery [facility] H&P      Consult: back cyst  Consulting Service: Dr. Fleming   Chief Complaint: back pain and drainage    HPI: Pt is a 89 y.o. male who presents with cellulitis of back cyst.  Had cyst drained several years ago.  Noted pain and drainage 3 days ago.     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    Atrioventricular block     Bleeding ulcer     Blood pressure elevated without history of HTN     BPH (benign prostatic hyperplasia)     Bradycardia     35-60's    Calculus of gallbladder without cholecystitis without obstruction 05/22/2023    Candidiasis of skin and nails     CAP (community acquired pneumonia)     Carbuncle of back (any part, except buttock)     Cellulitis and abscess of trunk     Cellulitis of ear canal     Cervical pain (neck)     Colon polyp     Diverticulitis     Encounter for blood transfusion 2021    X3    GIB (gastrointestinal bleeding) 12/30/2021    Hematochezia     History of shortness of breath     Hypertension     IFG (impaired fasting glucose)     Iron deficiency anemia     Kidney stone     Lightheaded     Lung mass 12/09/2021    Malaise and fatigue     Obesity, unspecified     Otalgia     Palpitations     Paroxysmal supraventricular tachycardia     Pleural effusion     PND (post-nasal drip)     Skin cancer 02/2022    RIGHT FACE    Thyroid disease     Type 2 diabetes mellitus without complication, without long-term current use of insulin 1/31/2024    Unspecified cataract 02/2023    Unspecified hemorrhoids      PSH:   Past Surgical History:   Procedure Laterality Date    ANGIOGRAM, CORONARY, WITH LEFT HEART CATHETERIZATION  09/01/2022    CIS    COLONOSCOPY       ESOPHAGOGASTRODUODENOSCOPY N/A 02/24/2022    Procedure: EGD (ESOPHAGOGASTRODUODENOSCOPY);  Surgeon: Duane Cardenas MD;  Location: [facility] ENDO;  Service: General;  Laterality: N/A;  #1 0600    INCISION AND DRAINAGE N/A 11/22/2024    Procedure: Incision and Drainage back abscess;  Surgeon: Sharifa Barrientos MD;  Location: [facility] OR;  Service: General;  Laterality: N/A;    INSERTION, PACEMAKER, LEADLESS N/A 5/6/2024    Procedure: INSERTION, PACEMAKER, LEADLESS;  Surgeon: Kt Benoit MD;  Location: [facility] CATH;  Service: Cardiology;  Laterality: N/A;    PHACOEMULSIFICATION, CATARACT, WITH IOL INSERTION Right 2/28/2023    Procedure: PHACOEMULSIFICATION, CATARACT, WITH IOL INSERTION;  Surgeon: Maldonado Garrison MD;  Location: [facility];  Service: Ophthalmology;  Laterality: Right;  5th - 0800  PHACO/IOL    PHACOEMULSIFICATION, CATARACT, WITH IOL INSERTION Left 3/28/2023    Procedure: PHACOEMULSIFICATION, CATARACT, WITH IOL INSERTION;  Surgeon: Maldonado Garrison MD;  Location: [facility];  Service: Ophthalmology;  Laterality: Left;  PHACO/IOL  3rd 0700    SKIN CANCER EXCISION Right 2022    RIGHT FACE    TOTAL KNEE ARTHROPLASTY Bilateral 2001     Meds: See medication list;  No anticoagulation  ALL: Patient has no known allergies.  FHX: non contributory   SOC:   Social History     Socioeconomic History    Marital status:    Tobacco Use    Smoking status: Never     Passive exposure: Never    Smokeless tobacco: Never   Substance and Sexual Activity    Alcohol use: Yes     Comment: OCC    Drug use: Never     Social Drivers of Health     Financial Resource Strain: Low Risk  (11/22/2024)    Overall Financial Resource Strain (CARDIA)     Difficulty of Paying Living Expenses: Not hard at all   Food Insecurity: No Food Insecurity (5/6/2024)    Hunger Vital Sign     Worried About Running Out of Food in the Last Year: Never true     Ran Out of Food in the Last Year: Never true   Transportation Needs: No Transportation Needs  "(5/6/2024)    PRAPARE - Transportation     Lack of Transportation (Medical): No     Lack of Transportation (Non-Medical): No   Physical Activity: Sufficiently Active (11/22/2024)    Exercise Vital Sign     Days of Exercise per Week: 3 days     Minutes of Exercise per Session: 60 min   Stress: No Stress Concern Present (11/22/2024)    [employer] [employer] - Occupational Stress Questionnaire     Feeling of Stress : Not at all   Housing Stability: Low Risk  (11/22/2024)    Housing Stability Vital Sign     Unable to Pay for Housing in the Last Year: No     Homeless in the Last Year: No     ROS: Review of Systems   Constitutional:  Negative for chills, fever, malaise/fatigue and weight loss.   Respiratory:  Negative for cough.    Cardiovascular:  Negative for chest pain.   Gastrointestinal:  Negative for abdominal pain, blood in stool, constipation, diarrhea, heartburn, melena, nausea and vomiting.   All other systems reviewed and are negative.      Physical Exam:  BP (!) 154/67 (BP Location: Left arm, Patient Position: Sitting)   Pulse 68   Resp 18   Ht 5' 10" (1.778 m)   Wt 109.7 kg (241 lb 12.8 oz)   SpO2 97%   BMI 34.69 kg/m²   Physical Exam  Constitutional:       General: He is not in acute distress.     Appearance: He is not ill-appearing or toxic-appearing.   Cardiovascular:      Rate and Rhythm: Normal rate and regular rhythm.   Pulmonary:      Effort: Pulmonary effort is normal. No respiratory distress.   Abdominal:      General: There is no distension.      Palpations: Abdomen is soft.      Tenderness: There is no abdominal tenderness. There is no guarding or rebound.   Skin:     General: Skin is warm and dry.      Capillary Refill: Capillary refill takes less than 2 seconds.      Comments: Large furuncle of the right upper back with surrounding erythema and purulent drainage.    Neurological:      Mental Status: He is alert.       Wt Readings from Last 2 Encounters:   12/05/24 109.3 " kg (241 lb)   11/21/24 108.6 kg (239 lb 8 oz)               A/P: Pt is a 89 y.o. male who presents with cellulitis of back cyst with abscess formation  -Direct admit for IV abx and I&D in OR tomorrow   -Please see IP H&P for further details       Sharifa Barrientos MD  [phone]

## 2024-12-11 NOTE — HOSPITAL COURSE
Patient underwent uneventful incision and drainage of back abscess on 11/22.  Patient tolerated procedure well and was discharged later that evening on oral antibiotics and plans for follow up in general surgery clinic in 1 week.

## 2024-12-11 NOTE — DISCHARGE SUMMARY
[facility] S[facility]  General Surgery  Discharge Summary      Patient Name: Geraldo Walkernader Mccallum Dr.  MRN: [id number]  Admission Date: 11/21/2024  Hospital Length of Stay: 0 days  Discharge Date and Time: 11/22/2024  4:15 PM  Attending Physician: No att. providers found   Discharging Provider: Sharifa Barrientos MD  Primary Care Provider: Stefan Fleming III, MD    HPI:   88yo male who presents with infected furuncle of the back with abscess.   Presented to  clinic where superficial I&D of most fluctuant area was performed.  Cultures obtained.  Recommended direct admit at that time for IV abx and more adequate incision and drainage under sedation.     Procedure(s) (LRB):  Incision and Drainage back abscess (N/A)      Indwelling Lines/Drains at time of discharge:   Lines/Drains/Airways       Drain  Duration                  Open Drain 11/22/24 1231 Tube - 1 Lateral;Right;Superior Back Penrose 1/4 inch 19 days                  Hospital Course: Patient underwent uneventful incision and drainage of back abscess on 11/22.  Patient tolerated procedure well and was discharged later that evening on oral antibiotics and plans for follow up in general surgery clinic in 1 week.    Goals of Care Treatment Preferences:  Code Status: Full Code      Consults:   Consults (From admission, onward)          Status Ordering Provider     Inpatient consult to Social Work/Case Management  Once        Provider:  (Not yet assigned)    SHARIFA Grayson            Significant Diagnostic Studies: see above    Pending Diagnostic Studies:       None          Final Active Diagnoses:    Diagnosis Date Noted POA    PRINCIPAL PROBLEM:  Ruptured epithelial inclusion cyst [L72.0] 11/21/2024 Yes    Abscess of back [L02.212] 11/22/2024 Yes    Type 2 diabetes mellitus without complication, without long-term current use of insulin [E11.9] 01/31/2024 Yes    Primary hypertension [I10]  Yes     Chronic      Problems Resolved During this Admission:       Discharged Condition: good    Disposition: Home or Self Care    Follow Up:   Contact information for follow-up providers       Sharifa Barrientos MD Follow up in 1 week(s).    Specialty: General Surgery  Contact information:  [address]  [address]  [facility] [address]  [phone]                       Contact information for after-discharge care       Home Medical Care       [facility] .    Service: Home Health Services  Contact information:  [address] , [address]  [facility] [address]  [phone]                                 Patient Instructions:      Ambulatory referral/consult to Home Health   Standing Status: Future   Referral Priority: Routine Referral Type: Home Health   Referral Reason: Specialty Services Required   Requested Specialty: Home Health Services   Number of Visits Requested: 1     Diet Adult Regular   Order Comments: Low fat     No driving until:   Order Comments: No driving while on narcotic pain medication     Notify your health care provider if you experience any of the following:  temperature >100.4     Notify your health care provider if you experience any of the following:  persistent nausea and vomiting or diarrhea     Notify your health care provider if you experience any of the following:  severe uncontrolled pain     Notify your health care provider if you experience any of the following:  redness, tenderness, or signs of infection (pain, swelling, redness, odor or green/yellow discharge around incision site)     Remove dressing in 24 hours   Order Comments: Remove white dressing in 24 hours  If white dressing becomes very wet or soiled, you may change it tonight   Wound is packed with white gauze - also remove gauze in 24 hours.  You do NOT need to repack the wound   Patient may shower daily and allow soapy water to run over wound  Home health will irrigate wound with saline during their visits   Place soft gauze between the penrose drain  (silicone loop) and the skin to prevent excess friction on the skin  Cover wound with absorbant bandage   Do not scrub or completely submerge in water until cleared by Dr. Barrientos     Wound care routine (specify)   Order Comments: Remove white dressing in 24 hours  If white dressing becomes very wet or soiled, you may change it tonight   Wound is packed with white gauze - also remove gauze in 24 hours.  You do NOT need to repack the wound   Patient may shower daily and allow soapy water to run over wound  Home health will irrigate wound with saline during their visits   Place soft gauze between the penrose drain (silicone loop) and the skin to prevent excess friction on the skin  Cover wound with absorbant bandage   Do not scrub or completely submerge in water until cleared by Dr. Barrientos     Activity as tolerated     Medications:  Reconciled Home Medications:      Medication List        CONTINUE taking these medications      acetaminophen 325 MG tablet  Commonly known as: TYLENOL  Take 2 tablets (650 mg total) by mouth every 6 (six) hours as needed for Pain.     albuterol 90 mcg/actuation inhaler  Commonly known as: VENTOLIN HFA  Inhale 2 puffs into the lungs every 6 (six) hours as needed for Wheezing. Rescue     aspirin 81 MG EC tablet  Commonly known as: ECOTRIN  1 tablet.     carvediloL 3.125 MG tablet  Commonly known as: COREG  Take 1 tablet (3.125 mg total) by mouth 2 (two) times daily.     furosemide 20 MG tablet  Commonly known as: LASIX  TAKE 1 TABLET BY MOUTH TWICE A DAY     levocetirizine 5 MG tablet  Commonly known as: XYZAL  TAKE 1 TABLET BY MOUTH EVERY DAY IN THE EVENING     pantoprazole 40 MG tablet  Commonly known as: PROTONIX  TAKE ONE TABLET BY MOUTH EVERY DAY     PRESERVISION AREDS ORAL  Take 2 tablets by mouth Daily.     rosuvastatin 5 MG tablet  Commonly known as: CRESTOR  TAKE 1 TABLET ORALLY ONCE A DAY AT NIGHT.     SYNTHROID 50 mcg tablet  Generic drug: levothyroxine  TAKE 1 TABLET BY MOUTH  EVERY DAY     timolol maleate 0.5% 0.5 % Drop  Commonly known as: TIMOPTIC     travoprost 0.004 % ophthalmic solution  Commonly known as: TRAVATAN Z     traZODone 50 MG tablet  Commonly known as: DESYREL  TAKE 1 TABLET BY MOUTH EVERY EVENING.     vitamin D 1000 units Tab  Commonly known as: VITAMIN D3  Take 1,000 Units by mouth once daily.            ASK your doctor about these medications      clindamycin 300 MG capsule  Commonly known as: CLEOCIN  Take 1 capsule (300 mg total) by mouth 3 (three) times daily. for 7 days  Ask about: Should I take this medication?     diphenhydrAMINE 50 MG capsule  Commonly known as: BENADRYL  Take 50mg by mouth 1 hour before contrast administration.     HYDROcodone-acetaminophen 5-325 mg per tablet  Commonly known as: NORCO  Take 1 tablet by mouth every 6 (six) hours as needed for Pain.  Ask about: Should I take this medication?     polyethylene glycol 17 gram/dose powder  Commonly known as: GLYCOLAX  Mix 17g in water daily, if not effective may increase to bid.     predniSONE 10 MG tablet  Commonly known as: DELTASONE  Take 1 tablet (10 mg total) by mouth once daily.     tamsulosin 0.4 mg Cap  Commonly known as: FLOMAX  Take 1 capsule (0.4 mg total) by mouth once daily.            Time spent on the discharge of patient: 5 minutes    Sharifa Barrientos MD  General Surgery  [facility]

## 2024-12-16 NOTE — PROGRESS NOTES
"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  Complaint with dressing changes.  Says pain and drainage have resolved.  Voices no other complaints.     ROS:  Negative except above    PE:  Vitals:    12/05/24 1100   BP: (!) 114/56   Patient Position: Sitting   Pulse: (!) 58   Resp: 18   SpO2: 97%   Weight: 109.3 kg (241 lb)   Height: 5' 10" (1.778 m)        Wt Readings from Last 2 Encounters:   12/05/24 109.3 kg (241 lb)   11/21/24 108.6 kg (239 lb 8 oz)           Gen: Alert and oriented x3, judgement intact, NAD  CV: RRR  Resp: CTAB; breaths non-labored and symmetrical  Abd: Soft, NT, ND, BS+  Extremities: No c/c/e    R back wound with penrose x2 in place   Improved erythema with scant drainage         A/P:  89 y.o. male s/p I&D of back abscess who present for follow up  -Cultures pos for MSSA - OP abx regimen completed  -Penrose drains removed   -Santyl daily to wound   -RTC 2 weeks     Sharifa Barrientos MD  General Surgery   [phone]        12/15/2024  10:12 PM  "

## 2024-12-18 PROBLEM — L98.492 NON-PRESSURE CHRONIC ULCER OF SKIN OF OTHER SITES WITH FAT LAYER EXPOSED: Status: RESOLVED | Noted: 2024-04-30 | Resolved: 2024-12-18

## 2024-12-18 PROBLEM — I20.89 OTHER FORMS OF ANGINA PECTORIS: Status: RESOLVED | Noted: 2023-02-16 | Resolved: 2024-12-18

## 2024-12-19 ENCOUNTER — OFFICE VISIT (OUTPATIENT)
Dept: SURGERY | Facility: CLINIC | Age: 89
End: 2024-12-19
Payer: MEDICARE

## 2024-12-19 VITALS
SYSTOLIC BLOOD PRESSURE: 128 MMHG | HEIGHT: 70 IN | HEART RATE: 71 BPM | BODY MASS INDEX: 33.36 KG/M2 | WEIGHT: 233 LBS | DIASTOLIC BLOOD PRESSURE: 63 MMHG | RESPIRATION RATE: 18 BRPM | OXYGEN SATURATION: 97 %

## 2024-12-19 DIAGNOSIS — L02.212 ABSCESS OF BACK: ICD-10-CM

## 2024-12-19 DIAGNOSIS — L02.222 FURUNCLE OF BACK, EXCEPT BUTTOCK: Primary | ICD-10-CM

## 2024-12-19 PROCEDURE — 99999 PR PBB SHADOW E&M-EST. PATIENT-LVL III: CPT | Mod: PBBFAC,,, | Performed by: STUDENT IN AN ORGANIZED HEALTH CARE EDUCATION/TRAINING PROGRAM

## 2024-12-19 PROCEDURE — 99213 OFFICE O/P EST LOW 20 MIN: CPT | Mod: PBBFAC | Performed by: STUDENT IN AN ORGANIZED HEALTH CARE EDUCATION/TRAINING PROGRAM

## 2024-12-19 PROCEDURE — 99024 POSTOP FOLLOW-UP VISIT: CPT | Mod: POP,,, | Performed by: STUDENT IN AN ORGANIZED HEALTH CARE EDUCATION/TRAINING PROGRAM

## 2024-12-30 NOTE — PROGRESS NOTES
"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  Complaint with dressing changes.  Says pain and drainage have resolved.  Voices no other complaints.     ROS:  Negative except above    PE:  Vitals:    12/19/24 1352   BP: 128/63   BP Location: Right arm   Patient Position: Sitting   Pulse: 71   Resp: 18   SpO2: 97%   Weight: 105.7 kg (233 lb 0.4 oz)   Height: 5' 10" (1.778 m)        Wt Readings from Last 2 Encounters:   12/19/24 105.7 kg (233 lb 0.4 oz)   12/18/24 105.7 kg (233 lb)           Gen: Alert and oriented x3, judgement intact, NAD  CV: RRR  Resp: CTAB; breaths non-labored and symmetrical  Abd: Soft, NT, ND, BS+  Extremities: No c/c/e    Healed r back wound with no SOI or drainage         A/P:  89 y.o. male s/p I&D of back abscess who present for follow up  -Abx course completed  -Wound healed   -RTC PRN   -OK to [facility] home health     Sharifa Barrientos MD  General Surgery   [phone]        12/29/2024  10:12 PM    "

## 2025-02-12 ENCOUNTER — DOCUMENT SCAN (OUTPATIENT)
Dept: HOME HEALTH SERVICES | Facility: HOSPITAL | Age: 89
End: 2025-02-12
Payer: MEDICARE

## 2025-04-07 PROBLEM — E11.36 TYPE 2 DIABETES MELLITUS WITH DIABETIC CATARACT, UNSPECIFIED WHETHER LONG TERM INSULIN USE: Status: ACTIVE | Noted: 2025-04-07

## 2025-04-07 PROBLEM — N18.32 STAGE 3B CHRONIC KIDNEY DISEASE: Status: ACTIVE | Noted: 2025-04-07

## 2025-04-07 PROBLEM — M19.041 ARTHRITIS OF FINGER OF RIGHT HAND: Status: ACTIVE | Noted: 2025-04-07

## 2025-08-14 ENCOUNTER — LAB VISIT (OUTPATIENT)
Dept: LAB | Facility: HOSPITAL | Age: 89
End: 2025-08-14
Attending: INTERNAL MEDICINE
Payer: MEDICARE

## 2025-08-14 DIAGNOSIS — E11.36 TYPE 2 DIABETES MELLITUS WITH DIABETIC CATARACT, UNSPECIFIED WHETHER LONG TERM INSULIN USE: ICD-10-CM

## 2025-08-14 DIAGNOSIS — Z12.5 PROSTATE CANCER SCREENING: ICD-10-CM

## 2025-08-14 DIAGNOSIS — E11.9 TYPE 2 DIABETES MELLITUS WITHOUT COMPLICATION, WITHOUT LONG-TERM CURRENT USE OF INSULIN: ICD-10-CM

## 2025-08-14 LAB
ABSOLUTE EOSINOPHIL (OHS): 0.16 K/UL
ABSOLUTE MONOCYTE (OHS): 0.59 K/UL (ref 0.3–1)
ABSOLUTE NEUTROPHIL COUNT (OHS): 5.03 K/UL (ref 1.8–7.7)
ALBUMIN/CREAT UR: 9.5 UG/MG
BASOPHILS # BLD AUTO: 0.02 K/UL
BASOPHILS NFR BLD AUTO: 0.3 %
CREAT UR-MCNC: 63 MG/DL (ref 23–375)
ERYTHROCYTE [DISTWIDTH] IN BLOOD BY AUTOMATED COUNT: 14.2 % (ref 11.5–14.5)
HCT VFR BLD AUTO: 43.3 % (ref 40–54)
HGB BLD-MCNC: 14 GM/DL (ref 14–18)
HOLD SPECIMEN: NORMAL
IMM GRANULOCYTES # BLD AUTO: 0.02 K/UL (ref 0–0.04)
IMM GRANULOCYTES NFR BLD AUTO: 0.3 % (ref 0–0.5)
LYMPHOCYTES # BLD AUTO: 1.11 K/UL (ref 1–4.8)
MCH RBC QN AUTO: 31 PG (ref 27–31)
MCHC RBC AUTO-ENTMCNC: 32.3 G/DL (ref 32–36)
MCV RBC AUTO: 96 FL (ref 82–98)
MICROALBUMIN UR-MCNC: 6 UG/ML (ref ?–5000)
NUCLEATED RBC (/100WBC) (OHS): 0 /100 WBC
PLATELET # BLD AUTO: 211 K/UL (ref 150–450)
PMV BLD AUTO: 10.6 FL (ref 9.2–12.9)
PSA SERPL-MCNC: 0.57 NG/ML
RBC # BLD AUTO: 4.52 M/UL (ref 4.6–6.2)
RELATIVE EOSINOPHIL (OHS): 2.3 %
RELATIVE LYMPHOCYTE (OHS): 16 % (ref 18–48)
RELATIVE MONOCYTE (OHS): 8.5 % (ref 4–15)
RELATIVE NEUTROPHIL (OHS): 72.6 % (ref 38–73)
TSH SERPL-ACNC: 1.75 UIU/ML (ref 0.4–4)
WBC # BLD AUTO: 6.93 K/UL (ref 3.9–12.7)

## 2025-08-14 PROCEDURE — 84153 ASSAY OF PSA TOTAL: CPT

## 2025-08-14 PROCEDURE — 85025 COMPLETE CBC W/AUTO DIFF WBC: CPT

## 2025-08-14 PROCEDURE — 82043 UR ALBUMIN QUANTITATIVE: CPT

## 2025-08-14 PROCEDURE — 36415 COLL VENOUS BLD VENIPUNCTURE: CPT

## 2025-08-14 PROCEDURE — 84443 ASSAY THYROID STIM HORMONE: CPT

## (undated) DEVICE — POSITIONER PINKPROTECT ARC MED

## (undated) DEVICE — GLOVE BIOGEL ECLIPSE SZ 6.5

## (undated) DEVICE — SOL IRRI STRL WATER 1000ML

## (undated) DEVICE — KIT BIOGUARD AIR WTR SUC VALVE

## (undated) DEVICE — NDL HYPO REG 25G X 1 1/2

## (undated) DEVICE — TUBE SUC UNIVERSAL .25XIN 6FT

## (undated) DEVICE — BASIN EMESIS GRAPHITE 500ML

## (undated) DEVICE — GOWN SURGICAL BRTHBL XL

## (undated) DEVICE — GOWN NONREINF SET-IN SLV XL

## (undated) DEVICE — TUBE TORRENT IRRIGATION

## (undated) DEVICE — SYR 10CC LUER LOCK

## (undated) DEVICE — CANNULA SUPERSOFT CO2 7FT

## (undated) DEVICE — SYS AQUASHIELD WATTER BOTTLE

## (undated) DEVICE — KIT VIA CUSTOM PROCEDURE

## (undated) DEVICE — GLOVE BIOGEL ECLIPSE SZ 7.5

## (undated) DEVICE — CANNULA SUPERSOFT CO2 M AD 7FT

## (undated) DEVICE — SHEILD EYE PROTCT BLUE FLEX

## (undated) DEVICE — UNDERPAD DELUXE FLUFF 30X30IN

## (undated) DEVICE — LINER SUCTION CANNISTER REGUGA

## (undated) DEVICE — COVER OVERHEAD SURG LT BLUE

## (undated) DEVICE — CLEANER CAUT TIP STRL 2X2IN

## (undated) DEVICE — TRAY SKIN SCRUB WET 4 COMPART

## (undated) DEVICE — Device

## (undated) DEVICE — ELECTRODE REM PLYHSV RETURN 9

## (undated) DEVICE — PAD ABDOMINAL STERILE 8X10IN

## (undated) DEVICE — PENCIL SMK EVAC CONNECTOR 10FT

## (undated) DEVICE — APPLICATOR CHLORAPREP ORN 26ML

## (undated) DEVICE — ELECTRODE FOAM 535 TEARDROP

## (undated) DEVICE — SPONGE DRY VIA GREEN

## (undated) DEVICE — SYR SLIP TIP 60 CC DISP

## (undated) DEVICE — SKIN MARKER STER DUAL TIP

## (undated) DEVICE — BITE BLOCK ADULT JUMBO ENDO W/

## (undated) DEVICE — UNDERPAD DISPOSABLE 30X30IN

## (undated) DEVICE — BANDAGE GAUZE COT STRL 4.5X4.1

## (undated) DEVICE — TUBING OXYGEN CONNECT BUBBLE

## (undated) DEVICE — SUT 2-0 ETHILON 18 FS

## (undated) DEVICE — CONNECTOR TORRENT SCP OLYMPUS

## (undated) DEVICE — GLOVE SURGICAL LATEX SZ 6.5

## (undated) DEVICE — STRAP KNEE & BODY DISP 4X34IN

## (undated) DEVICE — FORCEP ENDOJAW OVL NDL 2X1550

## (undated) DEVICE — DRAIN PENRS SIL STRL .25X18IN

## (undated) DEVICE — SOL NACL IRR 1000ML BTL

## (undated) DEVICE — BLANKET UPPER BODY 78.7X29.9IN

## (undated) DEVICE — UNDERGLOVES BIOGEL PI SZ 7 LF

## (undated) DEVICE — LINER GLOVE POWDERFREE SZ 6.5

## (undated) DEVICE — UNDERGLOVE BIOGEL PI SZ 6.5 LF